# Patient Record
Sex: MALE | Race: WHITE | Employment: OTHER | URBAN - METROPOLITAN AREA
[De-identification: names, ages, dates, MRNs, and addresses within clinical notes are randomized per-mention and may not be internally consistent; named-entity substitution may affect disease eponyms.]

---

## 2017-01-09 ENCOUNTER — TELEPHONE (OUTPATIENT)
Dept: FAMILY MEDICINE CLINIC | Facility: CLINIC | Age: 57
End: 2017-01-09

## 2017-01-09 DIAGNOSIS — E55.9 VITAMIN D DEFICIENCY: ICD-10-CM

## 2017-01-09 DIAGNOSIS — Z00.00 LABORATORY TESTS ORDERED AS PART OF A COMPLETE PHYSICAL EXAM (CPE): ICD-10-CM

## 2017-01-09 DIAGNOSIS — Z12.5 SCREENING FOR PROSTATE CANCER: ICD-10-CM

## 2017-01-09 DIAGNOSIS — Z13.29 SCREENING FOR THYROID DISORDER: ICD-10-CM

## 2017-01-09 DIAGNOSIS — E78.00 PURE HYPERCHOLESTEROLEMIA: Primary | ICD-10-CM

## 2017-01-09 DIAGNOSIS — Z13.0 SCREENING FOR IRON DEFICIENCY ANEMIA: ICD-10-CM

## 2017-01-09 NOTE — TELEPHONE ENCOUNTER
Please authorize pended labs below. Pt has an appt on 03/02/2017 and would like to get his labs done prior to his appt.

## 2017-02-13 ENCOUNTER — TELEPHONE (OUTPATIENT)
Dept: FAMILY MEDICINE CLINIC | Facility: CLINIC | Age: 57
End: 2017-02-13

## 2017-02-13 DIAGNOSIS — J30.89 OTHER ALLERGIC RHINITIS: ICD-10-CM

## 2017-02-13 DIAGNOSIS — Z88.9 MULTIPLE ALLERGIES: Primary | ICD-10-CM

## 2017-02-13 NOTE — TELEPHONE ENCOUNTER
Message received today from [facility]. See below and advise if OK to refer. THIS IS AN INTERNAL REFERRAL.     Patient Name: Genesis Hanna   : 1960   Reason for the order/referral: f/u apt   PCP: Slade Harman   Refer to Provider ([facility]

## 2017-02-22 ENCOUNTER — LAB ENCOUNTER (OUTPATIENT)
Dept: LAB | Age: 57
End: 2017-02-22
Attending: FAMILY MEDICINE
Payer: COMMERCIAL

## 2017-02-22 DIAGNOSIS — Z13.29 SCREENING FOR THYROID DISORDER: ICD-10-CM

## 2017-02-22 DIAGNOSIS — E78.00 PURE HYPERCHOLESTEROLEMIA: ICD-10-CM

## 2017-02-22 DIAGNOSIS — Z12.5 SCREENING FOR PROSTATE CANCER: ICD-10-CM

## 2017-02-22 DIAGNOSIS — Z00.00 LABORATORY TESTS ORDERED AS PART OF A COMPLETE PHYSICAL EXAM (CPE): ICD-10-CM

## 2017-02-22 DIAGNOSIS — Z13.0 SCREENING FOR IRON DEFICIENCY ANEMIA: ICD-10-CM

## 2017-02-22 DIAGNOSIS — E55.9 VITAMIN D DEFICIENCY: ICD-10-CM

## 2017-02-22 LAB
25-HYDROXYVITAMIN D (TOTAL): 27.5 NG/ML (ref 30–100)
ALBUMIN SERPL-MCNC: 4 G/DL (ref 3.5–4.8)
ALP LIVER SERPL-CCNC: 91 U/L (ref 45–117)
ALT SERPL-CCNC: 40 U/L (ref 17–63)
AST SERPL-CCNC: 18 U/L (ref 15–41)
BASOPHILS # BLD AUTO: 0.07 X10(3) UL (ref 0–0.1)
BASOPHILS NFR BLD AUTO: 1.2 %
BILIRUB SERPL-MCNC: 0.5 MG/DL (ref 0.1–2)
BILIRUB UR QL STRIP.AUTO: NEGATIVE
BUN BLD-MCNC: 12 MG/DL (ref 8–20)
CALCIUM BLD-MCNC: 9.2 MG/DL (ref 8.3–10.3)
CHLORIDE: 106 MMOL/L (ref 101–111)
CHOLEST SMN-MCNC: 196 MG/DL (ref ?–200)
CLARITY UR REFRACT.AUTO: CLEAR
CO2: 30 MMOL/L (ref 22–32)
COLOR UR AUTO: YELLOW
CREAT BLD-MCNC: 0.98 MG/DL (ref 0.7–1.3)
EOSINOPHIL # BLD AUTO: 0.34 X10(3) UL (ref 0–0.3)
EOSINOPHIL NFR BLD AUTO: 5.7 %
ERYTHROCYTE [DISTWIDTH] IN BLOOD BY AUTOMATED COUNT: 12 % (ref 11.5–16)
GLUCOSE BLD-MCNC: 98 MG/DL (ref 70–99)
GLUCOSE UR STRIP.AUTO-MCNC: NEGATIVE MG/DL
HCT VFR BLD AUTO: 44.2 % (ref 37–53)
HDLC SERPL-MCNC: 47 MG/DL (ref 45–?)
HDLC SERPL: 4.17 {RATIO} (ref ?–4.97)
HGB BLD-MCNC: 15.6 G/DL (ref 13–17)
IMMATURE GRANULOCYTE COUNT: 0.02 X10(3) UL (ref 0–1)
IMMATURE GRANULOCYTE RATIO %: 0.3 %
KETONES UR STRIP.AUTO-MCNC: NEGATIVE MG/DL
LDLC SERPL CALC-MCNC: 124 MG/DL (ref ?–130)
LEUKOCYTE ESTERASE UR QL STRIP.AUTO: NEGATIVE
LYMPHOCYTES # BLD AUTO: 2.14 X10(3) UL (ref 0.9–4)
LYMPHOCYTES NFR BLD AUTO: 35.8 %
M PROTEIN MFR SERPL ELPH: 7.5 G/DL (ref 6.1–8.3)
MCH RBC QN AUTO: 31.3 PG (ref 27–33.2)
MCHC RBC AUTO-ENTMCNC: 35.3 G/DL (ref 31–37)
MCV RBC AUTO: 88.6 FL (ref 80–99)
MONOCYTES # BLD AUTO: 0.52 X10(3) UL (ref 0.1–0.6)
MONOCYTES NFR BLD AUTO: 8.7 %
NEUTROPHIL ABS PRELIM: 2.88 X10 (3) UL (ref 1.3–6.7)
NEUTROPHILS # BLD AUTO: 2.88 X10(3) UL (ref 1.3–6.7)
NEUTROPHILS NFR BLD AUTO: 48.3 %
NITRITE UR QL STRIP.AUTO: NEGATIVE
NONHDLC SERPL-MCNC: 149 MG/DL (ref ?–130)
PH UR STRIP.AUTO: 5 [PH] (ref 4.5–8)
PLATELET # BLD AUTO: 276 10(3)UL (ref 150–450)
POTASSIUM SERPL-SCNC: 4.6 MMOL/L (ref 3.6–5.1)
PROT UR STRIP.AUTO-MCNC: NEGATIVE MG/DL
PSA SERPL-MCNC: 0.82 NG/ML (ref 0.01–4)
RBC # BLD AUTO: 4.99 X10(6)UL (ref 4.3–5.7)
RBC UR QL AUTO: NEGATIVE
RED CELL DISTRIBUTION WIDTH-SD: 38.7 FL (ref 35.1–46.3)
SODIUM SERPL-SCNC: 140 MMOL/L (ref 136–144)
SP GR UR STRIP.AUTO: 1.02 (ref 1–1.03)
TRIGLYCERIDES: 126 MG/DL (ref ?–150)
TSI SER-ACNC: 1.07 MIU/ML (ref 0.35–5.5)
UROBILINOGEN UR STRIP.AUTO-MCNC: <2 MG/DL
VLDL: 25 MG/DL (ref 5–40)
WBC # BLD AUTO: 6 X10(3) UL (ref 4–13)

## 2017-02-22 PROCEDURE — 85025 COMPLETE CBC W/AUTO DIFF WBC: CPT

## 2017-02-22 PROCEDURE — 81003 URINALYSIS AUTO W/O SCOPE: CPT

## 2017-02-22 PROCEDURE — 84443 ASSAY THYROID STIM HORMONE: CPT

## 2017-02-22 PROCEDURE — 80053 COMPREHEN METABOLIC PANEL: CPT

## 2017-02-22 PROCEDURE — 84153 ASSAY OF PSA TOTAL: CPT

## 2017-02-22 PROCEDURE — 36415 COLL VENOUS BLD VENIPUNCTURE: CPT

## 2017-02-22 PROCEDURE — 80061 LIPID PANEL: CPT

## 2017-02-22 PROCEDURE — 82306 VITAMIN D 25 HYDROXY: CPT

## 2017-03-02 ENCOUNTER — OFFICE VISIT (OUTPATIENT)
Dept: FAMILY MEDICINE CLINIC | Facility: CLINIC | Age: 57
End: 2017-03-02

## 2017-03-02 VITALS
OXYGEN SATURATION: 98 % | RESPIRATION RATE: 16 BRPM | DIASTOLIC BLOOD PRESSURE: 72 MMHG | TEMPERATURE: 97 F | SYSTOLIC BLOOD PRESSURE: 120 MMHG | HEART RATE: 76 BPM | WEIGHT: 214 LBS | BODY MASS INDEX: 28.36 KG/M2 | HEIGHT: 73 IN

## 2017-03-02 DIAGNOSIS — E55.9 VITAMIN D DEFICIENCY: ICD-10-CM

## 2017-03-02 DIAGNOSIS — Z00.00 PHYSICAL EXAM, ANNUAL: Primary | ICD-10-CM

## 2017-03-02 PROCEDURE — 99396 PREV VISIT EST AGE 40-64: CPT | Performed by: FAMILY MEDICINE

## 2017-03-02 RX ORDER — ERGOCALCIFEROL 1.25 MG/1
50000 CAPSULE ORAL
Qty: 4 CAPSULE | Refills: 2 | Status: SHIPPED | OUTPATIENT
Start: 2017-03-02 | End: 2017-04-01

## 2017-03-02 NOTE — PROGRESS NOTES
Iris Rodriguez is a 61year old male who presents for a complete physical exam.   HPI:   Pt has no complains.       Immunization History  Administered            Date(s) Administered    >= 3 Yrs, Influenza Vaccine,(67322),[facility] Azelastine-Fluticasone (DYMISTA) 137-50 MCG/ACT Nasal Suspension by Nasal route. Disp:  Rfl:    Cetirizine-Pseudoephedrine (ALLERGY D-12 OR) Take  by mouth. Disp:  Rfl:    Omega-3 Fatty Acids (FISH OIL OR) by Does not apply route.  Disp:  Rfl:    Multiple asthma    EXAM:   /72 mmHg  Pulse 76  Temp(Src) 97.2 °F (36.2 °C) (Oral)  Resp 16  Ht 73\"  Wt 214 lb  BMI 28.24 kg/m2  SpO2 98%  Body mass index is 28.24 kg/(m^2).    GENERAL: well developed, well nourished,in no apparent distress  SKIN: no rashes,no ROUTINE   Result Value Ref Range   Urine Color Yellow Yellow   Clarity Urine Clear Clear   Spec Gravity 1.017 1.001-1.030   Glucose Urine Negative Negative mg/dl   Bilirubin Urine Negative Negative   Ketones Urine Negative Negative mg/dL   Blood Urine Nega blood test results reviewed with patient in the office. Pt is UTD with screening colonoscopy. The patient indicates understanding of these issues and agrees to the plan. The patient is asked to return for CPX in 1 year.

## 2017-04-03 ENCOUNTER — TELEPHONE (OUTPATIENT)
Dept: FAMILY MEDICINE CLINIC | Facility: CLINIC | Age: 57
End: 2017-04-03

## 2017-04-03 NOTE — TELEPHONE ENCOUNTER
Call from pt asking for appt with dr Baltazar Alejandra or other provider in of[place] tomorrow. sts has hx of occasional right knee pain, stepped down normally today and right knee gave out, now having pain and sl difficulty walking.  Taking ibuprofen and trying to s

## 2017-04-04 ENCOUNTER — HOSPITAL ENCOUNTER (OUTPATIENT)
Dept: GENERAL RADIOLOGY | Age: 57
Discharge: HOME | End: 2017-04-04
Attending: FAMILY MEDICINE
Payer: COMMERCIAL

## 2017-04-04 ENCOUNTER — OFFICE VISIT (OUTPATIENT)
Dept: FAMILY MEDICINE CLINIC | Facility: CLINIC | Age: 57
End: 2017-04-04

## 2017-04-04 VITALS
HEART RATE: 84 BPM | SYSTOLIC BLOOD PRESSURE: 108 MMHG | WEIGHT: 216 LBS | TEMPERATURE: 98 F | HEIGHT: 73 IN | DIASTOLIC BLOOD PRESSURE: 60 MMHG | OXYGEN SATURATION: 99 % | RESPIRATION RATE: 16 BRPM | BODY MASS INDEX: 28.63 KG/M2

## 2017-04-04 DIAGNOSIS — S83.92XA SPRAIN OF KNEE/LEG, LEFT, INITIAL ENCOUNTER: ICD-10-CM

## 2017-04-04 DIAGNOSIS — S83.92XA SPRAIN OF KNEE/LEG, LEFT, INITIAL ENCOUNTER: Primary | ICD-10-CM

## 2017-04-04 PROCEDURE — 73560 X-RAY EXAM OF KNEE 1 OR 2: CPT

## 2017-04-04 PROCEDURE — 99214 OFFICE O/P EST MOD 30 MIN: CPT | Performed by: FAMILY MEDICINE

## 2017-04-04 RX ORDER — NAPROXEN 500 MG/1
500 TABLET ORAL 2 TIMES DAILY WITH MEALS
Qty: 28 TABLET | Refills: 0 | Status: SHIPPED | OUTPATIENT
Start: 2017-04-04 | End: 2017-04-18

## 2017-04-04 RX ORDER — NAPROXEN 500 MG/1
500 TABLET ORAL 2 TIMES DAILY WITH MEALS
COMMUNITY
End: 2017-09-15 | Stop reason: ALTCHOICE

## 2017-04-04 RX ORDER — ERGOCALCIFEROL (VITAMIN D2) 1250 MCG
CAPSULE ORAL WEEKLY
COMMUNITY
End: 2017-05-12 | Stop reason: ALTCHOICE

## 2017-04-04 RX ORDER — IBUPROFEN 200 MG
200 TABLET ORAL EVERY 6 HOURS PRN
COMMUNITY
End: 2018-03-07 | Stop reason: ALTCHOICE

## 2017-04-04 NOTE — PATIENT INSTRUCTIONS
Knee Sprain    A sprain is an injury to the ligaments or capsule that holds a joint together. There are no broken bones. Most sprains take 3 to 6 weeks to heal. If it a severe sprain where the ligament is completely torn, it can take months to recover. · You may use over-the-counter pain medicine to control pain, unless another pain medicine was prescribed. If you have chronic liver or kidney disease or ever had a stomach ulcer or GI bleeding, talk with your healthcare provider before using these medicine RICE stands for rest, ice, compression, and elevation. These can limit pain and swelling after an injury.  RICE may be recommended to help treat fractures, sprains, strains, and bruises or bumps.   Home care  The following explain the details of RICE:  · Re © 1656-6434 [address], [address] [place]. All rights reserved. This information is not intended as a substitute for professional medical care. Always follow your healthcare professional's instructions.

## 2017-04-04 NOTE — PROGRESS NOTES
[facility] Family Medicine Office Note  Chief Complaint:   Patient presents with:  Knee Pain: left knee gave out yesterday, mild to severe pain, started since november progessivley got worse.       HPI:   This is a 61year old male coming in for l Urine Negative Negative mg/dL   Blood Urine Negative Negative   pH Urine 5.0 4.5-8.0   Protein Urine Negative Negative mg/dl   Urobilinogen Urine <2.0 0.2-2.0 mg/dL   Nitrite Urine Negative Negative   Leukocyte Esterase Urine Negative Negative   Microscopi Father    • Lipids Father    • other[other] [OTHER] Father      arthritis   • other[other] [OTHER] Mother      bowel rupture   • other[other] [OTHER] Mother      arthritis   • Heart Disorder Paternal Grandmother    • Heart Disorder Paternal Grandfather joint pain, denies joint swelling or stiffness. NEUROLOGICAL:  Denies headache, seizures, dizziness, syncope, paralysis, ataxia, numbness or tingling in the extremities,change in bowel or bladder control.   HEMATOLOGIC:  Denies anemia, bleeding or bruising (chronic per patient). NEURO:  No deficit, strength and tone, sensory intact. ASSESSMENT AND PLAN:   1.  Sprain of knee/leg, left, initial encounter  Discussed nature of illness, RICE, given ace wrap and use as directed, avoid provocative factors, consi

## 2017-05-12 ENCOUNTER — OFFICE VISIT (OUTPATIENT)
Dept: FAMILY MEDICINE CLINIC | Facility: CLINIC | Age: 57
End: 2017-05-12

## 2017-05-12 VITALS
TEMPERATURE: 98 F | HEART RATE: 80 BPM | SYSTOLIC BLOOD PRESSURE: 112 MMHG | BODY MASS INDEX: 28.89 KG/M2 | RESPIRATION RATE: 16 BRPM | HEIGHT: 73 IN | WEIGHT: 218 LBS | DIASTOLIC BLOOD PRESSURE: 82 MMHG

## 2017-05-12 DIAGNOSIS — M25.562 ACUTE PAIN OF LEFT KNEE: Primary | ICD-10-CM

## 2017-05-12 PROCEDURE — 99213 OFFICE O/P EST LOW 20 MIN: CPT | Performed by: FAMILY MEDICINE

## 2017-05-12 RX ORDER — DICLOFENAC SODIUM 75 MG/1
75 TABLET, DELAYED RELEASE ORAL 2 TIMES DAILY
Qty: 30 TABLET | Refills: 0 | Status: SHIPPED | OUTPATIENT
Start: 2017-05-12 | End: 2017-09-15 | Stop reason: ALTCHOICE

## 2017-05-12 NOTE — PATIENT INSTRUCTIONS
Diclofenac 75 mg 1 tablet twice a day with food. See Dr. Alexis Kline orthopedic for evaluation of the knee problem.

## 2017-05-12 NOTE — PROGRESS NOTES
Lluvia Martin is a 61year old male. cc left knee pain  HPI:   Patient is coming for evaluation of the left knee pain which started in April. Patient was exercising his knee gave out and he started to have a pain.   The pain is at the left medial aspec exertion  CARDIOVASCULAR: denies chest pain on exertion  GI: denies abdominal pain and denies heartburn  NEURO: denies headaches, numbness no tingling  Musculoskeletal left knee pain and swelling  Psych normal mood.     EXAM:   /82 mmHg  Pulse 80  Tem

## 2017-05-31 ENCOUNTER — HOSPITAL ENCOUNTER (OUTPATIENT)
Dept: MRI IMAGING | Facility: HOSPITAL | Age: 57
Discharge: HOME | End: 2017-05-31
Attending: ORTHOPAEDIC SURGERY
Payer: COMMERCIAL

## 2017-05-31 DIAGNOSIS — G89.29 CHRONIC PAIN OF LEFT KNEE: ICD-10-CM

## 2017-05-31 DIAGNOSIS — M17.12 PRIMARY OSTEOARTHRITIS OF LEFT KNEE: ICD-10-CM

## 2017-05-31 DIAGNOSIS — M25.562 CHRONIC PAIN OF LEFT KNEE: ICD-10-CM

## 2017-05-31 DIAGNOSIS — M23.92 INTERNAL DERANGEMENT OF KNEE, LEFT: ICD-10-CM

## 2017-05-31 PROCEDURE — 73721 MRI JNT OF LWR EXTRE W/O DYE: CPT | Performed by: ORTHOPAEDIC SURGERY

## 2017-06-08 PROBLEM — M17.12 PRIMARY OSTEOARTHRITIS OF LEFT KNEE: Status: ACTIVE | Noted: 2017-06-08

## 2017-06-08 PROBLEM — S83.242A ACUTE MEDIAL MENISCUS TEAR OF LEFT KNEE, INITIAL ENCOUNTER: Status: ACTIVE | Noted: 2017-06-08

## 2017-06-22 ENCOUNTER — EKG ENCOUNTER (OUTPATIENT)
Dept: LAB | Facility: HOSPITAL | Age: 57
End: 2017-06-22
Attending: ORTHOPAEDIC SURGERY
Payer: COMMERCIAL

## 2017-06-22 DIAGNOSIS — Z01.810 PRE-OPERATIVE CARDIOVASCULAR EXAMINATION: Primary | ICD-10-CM

## 2017-06-22 DIAGNOSIS — Z01.812 PRE-OPERATIVE LABORATORY EXAMINATION: ICD-10-CM

## 2017-06-22 PROCEDURE — 36415 COLL VENOUS BLD VENIPUNCTURE: CPT

## 2017-06-22 PROCEDURE — 80053 COMPREHEN METABOLIC PANEL: CPT

## 2017-06-22 PROCEDURE — 93010 ELECTROCARDIOGRAM REPORT: CPT | Performed by: INTERNAL MEDICINE

## 2017-06-22 PROCEDURE — 93005 ELECTROCARDIOGRAM TRACING: CPT

## 2017-06-22 PROCEDURE — 85025 COMPLETE CBC W/AUTO DIFF WBC: CPT

## 2017-07-24 ENCOUNTER — OFFICE VISIT (OUTPATIENT)
Dept: FAMILY MEDICINE CLINIC | Facility: CLINIC | Age: 57
End: 2017-07-24

## 2017-07-24 VITALS
BODY MASS INDEX: 27.83 KG/M2 | RESPIRATION RATE: 16 BRPM | TEMPERATURE: 99 F | HEART RATE: 88 BPM | DIASTOLIC BLOOD PRESSURE: 80 MMHG | HEIGHT: 73 IN | WEIGHT: 210 LBS | SYSTOLIC BLOOD PRESSURE: 130 MMHG

## 2017-07-24 DIAGNOSIS — M79.671 RIGHT FOOT PAIN: Primary | ICD-10-CM

## 2017-07-24 PROCEDURE — 99213 OFFICE O/P EST LOW 20 MIN: CPT | Performed by: FAMILY MEDICINE

## 2017-07-24 NOTE — PROGRESS NOTES
[facility] Family Medicine Office Note  Chief Complaint:   Patient presents with:  Pain: right foot puncture wound      HPI:   This is a 61year old male coming in for right plantar foot pain for the past 3 weeks.   Patient states he has been doin Prescriptions:  Diclofenac Sodium 75 MG Oral Tab EC Take 1 tablet (75 mg total) by mouth 2 (two) times daily. Disp: 30 tablet Rfl: 0   ibuprofen (ADVIL) 200 MG Oral Tab Take 200 mg by mouth every 6 (six) hours as needed for Pain.  Disp:  Rfl:    naproxen 50 upon palpation of area of concern on middle of plantar fascia  NEURO:  CN 2 - 12 grossly intact     ASSESSMENT AND PLAN:   1.  Right foot pain  -  Check xray to r/o foreign body  -  No signs of infection on exam  -  Tetanus vaccine up to date  -  Will refer

## 2017-08-08 ENCOUNTER — HOSPITAL ENCOUNTER (OUTPATIENT)
Dept: GENERAL RADIOLOGY | Age: 57
Discharge: HOME | End: 2017-08-08
Attending: FAMILY MEDICINE
Payer: COMMERCIAL

## 2017-08-08 DIAGNOSIS — M79.671 RIGHT FOOT PAIN: ICD-10-CM

## 2017-08-08 PROBLEM — S83.282A ACUTE LATERAL MENISCUS TEAR OF LEFT KNEE: Status: ACTIVE | Noted: 2017-08-08

## 2017-08-08 PROBLEM — S83.242A ACUTE MEDIAL MENISCUS TEAR OF LEFT KNEE: Status: ACTIVE | Noted: 2017-08-08

## 2017-08-08 PROCEDURE — 73630 X-RAY EXAM OF FOOT: CPT | Performed by: FAMILY MEDICINE

## 2017-08-10 ENCOUNTER — HOSPITAL ENCOUNTER (OUTPATIENT)
Dept: PHYSICAL THERAPY | Facility: HOSPITAL | Age: 57
Setting detail: THERAPIES SERIES
Discharge: HOME | End: 2017-08-10
Attending: ORTHOPAEDIC SURGERY
Payer: COMMERCIAL

## 2017-08-10 DIAGNOSIS — M17.12 PRIMARY OSTEOARTHRITIS OF LEFT KNEE: ICD-10-CM

## 2017-08-10 DIAGNOSIS — S83.242D ACUTE MEDIAL MENISCUS TEAR OF LEFT KNEE, SUBSEQUENT ENCOUNTER: ICD-10-CM

## 2017-08-10 PROCEDURE — 97110 THERAPEUTIC EXERCISES: CPT

## 2017-08-10 PROCEDURE — 97162 PT EVAL MOD COMPLEX 30 MIN: CPT

## 2017-08-10 NOTE — PROGRESS NOTES
POST-OP KNEE EVALUATION:   Referring Physician: Dr. Isabel Oviedo  Diagnosis: s/p arthroscopic sx L knee, meniscal tear, OA  Date of Service: 8/10/2017     PATIENT SUMMARY   Tyesha Patel is a 61year old y/o male who presents to therapy today s/p arthro involved Moderate Complexity decision making due to 3+ personal factors/comorbidities, 3 body structures involved/activity limitations, and evolving symptoms including changing pain levels.         Total Timed Treatment: 20 min     Total Treatment Time: 45 8/10/2017  To:11/8/2017

## 2017-08-15 ENCOUNTER — HOSPITAL ENCOUNTER (OUTPATIENT)
Dept: PHYSICAL THERAPY | Facility: HOSPITAL | Age: 57
Setting detail: THERAPIES SERIES
Discharge: HOME | End: 2017-08-15
Attending: ORTHOPAEDIC SURGERY
Payer: COMMERCIAL

## 2017-08-15 PROCEDURE — 97110 THERAPEUTIC EXERCISES: CPT

## 2017-08-15 PROCEDURE — 97016 VASOPNEUMATIC DEVICE THERAPY: CPT

## 2017-08-15 PROCEDURE — 97140 MANUAL THERAPY 1/> REGIONS: CPT

## 2017-08-15 NOTE — PROGRESS NOTES
Dx: s/p arthroscopy,meniscal sx        Authorized # of Visits:  8         Next MD visit: none scheduled  Fall Risk: standard         Precautions: n/a             Subjective:rode bike a couple times and felt good.  powerrwashed deck yesterday and now sore  O

## 2017-08-18 ENCOUNTER — HOSPITAL ENCOUNTER (OUTPATIENT)
Dept: PHYSICAL THERAPY | Facility: HOSPITAL | Age: 57
Setting detail: THERAPIES SERIES
Discharge: HOME | End: 2017-08-18
Attending: ORTHOPAEDIC SURGERY
Payer: COMMERCIAL

## 2017-08-18 PROCEDURE — 97140 MANUAL THERAPY 1/> REGIONS: CPT

## 2017-08-18 PROCEDURE — 97016 VASOPNEUMATIC DEVICE THERAPY: CPT

## 2017-08-18 PROCEDURE — 97110 THERAPEUTIC EXERCISES: CPT

## 2017-08-18 NOTE — PROGRESS NOTES
Dx: s/p arthroscopy,meniscal sx        Authorized # of Visits:  8         Next MD visit: none scheduled  Fall Risk: standard         Precautions: n/a             Subjective:reports knee feels about 80%.  Reports stair ex difficult due to burning pain R foot

## 2017-08-22 ENCOUNTER — HOSPITAL ENCOUNTER (OUTPATIENT)
Dept: PHYSICAL THERAPY | Facility: HOSPITAL | Age: 57
Setting detail: THERAPIES SERIES
Discharge: HOME | End: 2017-08-22
Attending: ORTHOPAEDIC SURGERY
Payer: COMMERCIAL

## 2017-08-22 PROCEDURE — 97140 MANUAL THERAPY 1/> REGIONS: CPT

## 2017-08-22 PROCEDURE — 97110 THERAPEUTIC EXERCISES: CPT

## 2017-08-22 NOTE — PROGRESS NOTES
Dx: s/p arthroscopy,meniscal sx        Authorized # of Visits:  8         Next MD visit: none scheduled  Fall Risk: standard         Precautions: n/a             Subjective:knee bend better but continues unable to kneel./ Foot pain unchanged  Objective: RO

## 2017-08-24 ENCOUNTER — HOSPITAL ENCOUNTER (OUTPATIENT)
Dept: PHYSICAL THERAPY | Facility: HOSPITAL | Age: 57
Setting detail: THERAPIES SERIES
Discharge: HOME | End: 2017-08-24
Attending: ORTHOPAEDIC SURGERY
Payer: COMMERCIAL

## 2017-08-24 PROCEDURE — 97110 THERAPEUTIC EXERCISES: CPT

## 2017-08-24 NOTE — PROGRESS NOTES
Dx: s/p arthroscopy,meniscal sx        Authorized # of Visits:  8         Next MD visit: none scheduled  Fall Risk: standard         Precautions: n/a            Progress Summary    Pt has attended 5 visits in Physical Therapy.      Subjective: knee painful TX#: 4/ Date:   8/24/2017              TX#: 5/ Date:               TX#: 6/ Date:               TX#: 7/ Date:               TX#: 8/   nustep x 5 min l-5 Stationary bike x 5 min cont'd cont'd      MT HS  And Patellar mob cont'd cont'd w/ addition of grimaldo

## 2017-09-15 ENCOUNTER — OFFICE VISIT (OUTPATIENT)
Dept: FAMILY MEDICINE CLINIC | Facility: CLINIC | Age: 57
End: 2017-09-15

## 2017-09-15 VITALS
RESPIRATION RATE: 18 BRPM | OXYGEN SATURATION: 98 % | HEART RATE: 85 BPM | DIASTOLIC BLOOD PRESSURE: 72 MMHG | WEIGHT: 211 LBS | SYSTOLIC BLOOD PRESSURE: 124 MMHG | BODY MASS INDEX: 27.96 KG/M2 | HEIGHT: 73 IN | TEMPERATURE: 97 F

## 2017-09-15 DIAGNOSIS — J01.00 ACUTE MAXILLARY SINUSITIS, RECURRENCE NOT SPECIFIED: Primary | ICD-10-CM

## 2017-09-15 PROCEDURE — 99213 OFFICE O/P EST LOW 20 MIN: CPT | Performed by: FAMILY MEDICINE

## 2017-09-15 RX ORDER — AZITHROMYCIN 250 MG/1
TABLET, FILM COATED ORAL
Qty: 6 TABLET | Refills: 0 | Status: SHIPPED | OUTPATIENT
Start: 2017-09-15 | End: 2018-03-07 | Stop reason: ALTCHOICE

## 2017-09-15 NOTE — PROGRESS NOTES
Davin Dominguez is a 61year old male. cc sinus congestion cough. HPI:   Coming to the office complaining of having sinus congestion cough for 1 week. He is a sinus pressure stuffiness worse at the end of the day.   Some postnasal drip hoarseness some e 97.1 °F (36.2 °C) (Oral)   Resp 18   Ht 73\"   Wt 211 lb   SpO2 98%   BMI 27.84 kg/m²   GENERAL: well developed, well nourished,in no apparent distress  SKIN: no rashes,no suspicious lesions  HEENT: atraumatic, normocephalic,ears - minimal fluid behind tym

## 2017-11-03 ENCOUNTER — IMMUNIZATION (OUTPATIENT)
Dept: FAMILY MEDICINE CLINIC | Facility: CLINIC | Age: 57
End: 2017-11-03

## 2017-11-03 DIAGNOSIS — Z23 NEED FOR VACCINATION: ICD-10-CM

## 2017-11-03 PROCEDURE — 90686 IIV4 VACC NO PRSV 0.5 ML IM: CPT | Performed by: FAMILY MEDICINE

## 2017-11-03 PROCEDURE — 90471 IMMUNIZATION ADMIN: CPT | Performed by: FAMILY MEDICINE

## 2018-03-07 ENCOUNTER — OFFICE VISIT (OUTPATIENT)
Dept: FAMILY MEDICINE CLINIC | Facility: CLINIC | Age: 58
End: 2018-03-07

## 2018-03-07 ENCOUNTER — LAB ENCOUNTER (OUTPATIENT)
Dept: LAB | Age: 58
End: 2018-03-07
Attending: FAMILY MEDICINE
Payer: COMMERCIAL

## 2018-03-07 VITALS
HEART RATE: 81 BPM | WEIGHT: 215 LBS | DIASTOLIC BLOOD PRESSURE: 78 MMHG | RESPIRATION RATE: 16 BRPM | SYSTOLIC BLOOD PRESSURE: 108 MMHG | BODY MASS INDEX: 28.49 KG/M2 | TEMPERATURE: 98 F | HEIGHT: 73 IN

## 2018-03-07 DIAGNOSIS — J30.89 NON-SEASONAL ALLERGIC RHINITIS DUE TO OTHER ALLERGIC TRIGGER: ICD-10-CM

## 2018-03-07 DIAGNOSIS — Z13.89 SCREENING FOR GENITOURINARY CONDITION: ICD-10-CM

## 2018-03-07 DIAGNOSIS — Z00.00 LABORATORY EXAMINATION ORDERED AS PART OF A ROUTINE GENERAL MEDICAL EXAMINATION: ICD-10-CM

## 2018-03-07 DIAGNOSIS — Z00.00 PHYSICAL EXAM, ANNUAL: Primary | ICD-10-CM

## 2018-03-07 DIAGNOSIS — E78.00 PURE HYPERCHOLESTEROLEMIA: Primary | ICD-10-CM

## 2018-03-07 DIAGNOSIS — Z12.5 SCREENING FOR PROSTATE CANCER: ICD-10-CM

## 2018-03-07 DIAGNOSIS — Z86.018 HISTORY OF NEUROMA: ICD-10-CM

## 2018-03-07 DIAGNOSIS — M79.671 RIGHT FOOT PAIN: ICD-10-CM

## 2018-03-07 DIAGNOSIS — E55.9 VITAMIN D DEFICIENCY: ICD-10-CM

## 2018-03-07 LAB
25-HYDROXYVITAMIN D (TOTAL): 32.6 NG/ML (ref 30–100)
ALBUMIN SERPL-MCNC: 3.9 G/DL (ref 3.5–4.8)
ALP LIVER SERPL-CCNC: 68 U/L (ref 45–117)
ALT SERPL-CCNC: 27 U/L (ref 17–63)
AST SERPL-CCNC: 19 U/L (ref 15–41)
BASOPHILS # BLD AUTO: 0.06 X10(3) UL (ref 0–0.1)
BASOPHILS NFR BLD AUTO: 1 %
BILIRUB SERPL-MCNC: 0.9 MG/DL (ref 0.1–2)
BILIRUB UR QL STRIP.AUTO: NEGATIVE
BUN BLD-MCNC: 17 MG/DL (ref 8–20)
CALCIUM BLD-MCNC: 9.3 MG/DL (ref 8.3–10.3)
CHLORIDE: 105 MMOL/L (ref 101–111)
CHOLEST SMN-MCNC: 204 MG/DL (ref ?–200)
CLARITY UR REFRACT.AUTO: CLEAR
CO2: 29 MMOL/L (ref 22–32)
COLOR UR AUTO: YELLOW
CREAT BLD-MCNC: 1 MG/DL (ref 0.7–1.3)
EOSINOPHIL # BLD AUTO: 0.38 X10(3) UL (ref 0–0.3)
EOSINOPHIL NFR BLD AUTO: 6.6 %
ERYTHROCYTE [DISTWIDTH] IN BLOOD BY AUTOMATED COUNT: 12.3 % (ref 11.5–16)
GLUCOSE BLD-MCNC: 98 MG/DL (ref 70–99)
GLUCOSE UR STRIP.AUTO-MCNC: NEGATIVE MG/DL
HCT VFR BLD AUTO: 43.9 % (ref 37–53)
HDLC SERPL-MCNC: 37 MG/DL (ref 45–?)
HDLC SERPL: 5.51 {RATIO} (ref ?–4.97)
HGB BLD-MCNC: 14.8 G/DL (ref 13–17)
IMMATURE GRANULOCYTE COUNT: 0.01 X10(3) UL (ref 0–1)
IMMATURE GRANULOCYTE RATIO %: 0.2 %
KETONES UR STRIP.AUTO-MCNC: NEGATIVE MG/DL
LDLC SERPL CALC-MCNC: 107 MG/DL (ref ?–130)
LEUKOCYTE ESTERASE UR QL STRIP.AUTO: NEGATIVE
LYMPHOCYTES # BLD AUTO: 1.55 X10(3) UL (ref 0.9–4)
LYMPHOCYTES NFR BLD AUTO: 27.1 %
M PROTEIN MFR SERPL ELPH: 7.2 G/DL (ref 6.1–8.3)
MCH RBC QN AUTO: 30.3 PG (ref 27–33.2)
MCHC RBC AUTO-ENTMCNC: 33.7 G/DL (ref 31–37)
MCV RBC AUTO: 90 FL (ref 80–99)
MONOCYTES # BLD AUTO: 0.52 X10(3) UL (ref 0.1–1)
MONOCYTES NFR BLD AUTO: 9.1 %
NEUTROPHIL ABS PRELIM: 3.21 X10 (3) UL (ref 1.3–6.7)
NEUTROPHILS # BLD AUTO: 3.21 X10(3) UL (ref 1.3–6.7)
NEUTROPHILS NFR BLD AUTO: 56 %
NITRITE UR QL STRIP.AUTO: NEGATIVE
NONHDLC SERPL-MCNC: 167 MG/DL (ref ?–130)
PH UR STRIP.AUTO: 5 [PH] (ref 4.5–8)
PLATELET # BLD AUTO: 234 10(3)UL (ref 150–450)
POTASSIUM SERPL-SCNC: 4.2 MMOL/L (ref 3.6–5.1)
PROT UR STRIP.AUTO-MCNC: NEGATIVE MG/DL
PSA SERPL-MCNC: 0.79 NG/ML (ref 0.01–4)
RBC # BLD AUTO: 4.88 X10(6)UL (ref 4.3–5.7)
RBC UR QL AUTO: NEGATIVE
RED CELL DISTRIBUTION WIDTH-SD: 40.5 FL (ref 35.1–46.3)
SODIUM SERPL-SCNC: 139 MMOL/L (ref 136–144)
SP GR UR STRIP.AUTO: 1.02 (ref 1–1.03)
TRIGL SERPL-MCNC: 300 MG/DL (ref ?–150)
TSI SER-ACNC: 1.5 MIU/ML (ref 0.35–5.5)
UROBILINOGEN UR STRIP.AUTO-MCNC: <2 MG/DL
VLDLC SERPL CALC-MCNC: 60 MG/DL (ref 5–40)
WBC # BLD AUTO: 5.7 X10(3) UL (ref 4–13)

## 2018-03-07 PROCEDURE — 82306 VITAMIN D 25 HYDROXY: CPT

## 2018-03-07 PROCEDURE — 80050 GENERAL HEALTH PANEL: CPT

## 2018-03-07 PROCEDURE — 36415 COLL VENOUS BLD VENIPUNCTURE: CPT

## 2018-03-07 PROCEDURE — 99396 PREV VISIT EST AGE 40-64: CPT | Performed by: FAMILY MEDICINE

## 2018-03-07 PROCEDURE — 84443 ASSAY THYROID STIM HORMONE: CPT

## 2018-03-07 PROCEDURE — 81003 URINALYSIS AUTO W/O SCOPE: CPT

## 2018-03-07 PROCEDURE — 80061 LIPID PANEL: CPT

## 2018-03-07 PROCEDURE — 84153 ASSAY OF PSA TOTAL: CPT

## 2018-03-07 PROCEDURE — 80053 COMPREHEN METABOLIC PANEL: CPT

## 2018-03-07 RX ORDER — BIOTIN 1 MG
1 TABLET ORAL DAILY
COMMUNITY

## 2018-03-07 NOTE — PROGRESS NOTES
Lluvia Martin is a 61year old male who presents for a complete physical exam.   HPI:   Pt complains of pain in the right foot. History of neuroma on the left side required injection.   Now she he feels that there is a burning pain in the bottom of th 11/20/2014 23   ----------  ALT (U/L)   Date Value   07/30/2015 38   02/18/2015 31   11/20/2014 37   ----------  Alt (U/L)   Date Value   06/22/2017 30   02/22/2017 40   08/22/2016 47   ----------     PSA (ng/mL)   Date Value   02/22/2017 0.818   02/18/2 times per week.   Diet: watches calories closely     REVIEW OF SYSTEMS:   GENERAL: feels well otherwise  SKIN: denies any unusual skin lesions  EYES:denies blurred vision or double vision  HEENT: denies nasal congestion, sinus pain or ST  LUNGS: denies shor neuroma  Non-seasonal allergic rhinitis due to other allergic trigger      Orders Placed This Encounter      CBC W/DIFF      COMP METABOLIC PANEL      LIPID PANEL      URINALYSIS, ROUTINE      PSA      TSH W REFLEX TO FREE T4      VITAMIN D, 25-HYDROXY

## 2018-07-03 ENCOUNTER — APPOINTMENT (OUTPATIENT)
Dept: LAB | Age: 58
End: 2018-07-03
Attending: FAMILY MEDICINE
Payer: COMMERCIAL

## 2018-07-03 DIAGNOSIS — E78.00 PURE HYPERCHOLESTEROLEMIA: ICD-10-CM

## 2018-07-03 LAB
ALBUMIN SERPL-MCNC: 3.8 G/DL (ref 3.5–4.8)
ALP LIVER SERPL-CCNC: 66 U/L (ref 45–117)
ALT SERPL-CCNC: 27 U/L (ref 17–63)
AST SERPL-CCNC: 17 U/L (ref 15–41)
BILIRUB SERPL-MCNC: 1 MG/DL (ref 0.1–2)
BUN BLD-MCNC: 11 MG/DL (ref 8–20)
CALCIUM BLD-MCNC: 8.9 MG/DL (ref 8.3–10.3)
CHLORIDE: 106 MMOL/L (ref 101–111)
CHOLEST SMN-MCNC: 186 MG/DL (ref ?–200)
CO2: 26 MMOL/L (ref 22–32)
CREAT BLD-MCNC: 0.99 MG/DL (ref 0.7–1.3)
GLUCOSE BLD-MCNC: 90 MG/DL (ref 70–99)
HDLC SERPL-MCNC: 40 MG/DL (ref 45–?)
HDLC SERPL: 4.65 {RATIO} (ref ?–4.97)
LDLC SERPL CALC-MCNC: 106 MG/DL (ref ?–130)
M PROTEIN MFR SERPL ELPH: 6.9 G/DL (ref 6.1–8.3)
NONHDLC SERPL-MCNC: 146 MG/DL (ref ?–130)
POTASSIUM SERPL-SCNC: 4.1 MMOL/L (ref 3.6–5.1)
SODIUM SERPL-SCNC: 141 MMOL/L (ref 136–144)
TRIGL SERPL-MCNC: 198 MG/DL (ref ?–150)
VLDLC SERPL CALC-MCNC: 40 MG/DL (ref 5–40)

## 2018-07-03 PROCEDURE — 80053 COMPREHEN METABOLIC PANEL: CPT

## 2018-07-03 PROCEDURE — 80061 LIPID PANEL: CPT

## 2018-07-03 PROCEDURE — 36415 COLL VENOUS BLD VENIPUNCTURE: CPT

## 2018-07-11 ENCOUNTER — OFFICE VISIT (OUTPATIENT)
Dept: FAMILY MEDICINE CLINIC | Facility: CLINIC | Age: 58
End: 2018-07-11

## 2018-07-11 VITALS
RESPIRATION RATE: 16 BRPM | HEIGHT: 73 IN | SYSTOLIC BLOOD PRESSURE: 120 MMHG | TEMPERATURE: 98 F | WEIGHT: 208 LBS | HEART RATE: 90 BPM | DIASTOLIC BLOOD PRESSURE: 60 MMHG | BODY MASS INDEX: 27.57 KG/M2

## 2018-07-11 DIAGNOSIS — R53.83 FATIGUE, UNSPECIFIED TYPE: ICD-10-CM

## 2018-07-11 DIAGNOSIS — E55.9 VITAMIN D DEFICIENCY: ICD-10-CM

## 2018-07-11 DIAGNOSIS — E78.00 PURE HYPERCHOLESTEROLEMIA: Primary | ICD-10-CM

## 2018-07-11 DIAGNOSIS — G57.61 MORTON'S NEUROMA OF RIGHT FOOT: ICD-10-CM

## 2018-07-11 DIAGNOSIS — G89.29 CHRONIC FOOT PAIN, RIGHT: ICD-10-CM

## 2018-07-11 DIAGNOSIS — M79.671 CHRONIC FOOT PAIN, RIGHT: ICD-10-CM

## 2018-07-11 PROCEDURE — 99214 OFFICE O/P EST MOD 30 MIN: CPT | Performed by: FAMILY MEDICINE

## 2018-07-11 NOTE — PROGRESS NOTES
Shelia Rodriges is a 60year old male. cc hyperlipidemia, tiredness fatigue, right foot pain, vitamin D deficiency  HPI:   Patients come to the office complaining for follow-up of hyperlipidemia. Trying to watch his diet.   Activity level is limited he i pain  RESPIRATORY: denies shortness of breath with exertion  CARDIOVASCULAR: denies chest pain on exertion  GI: denies abdominal pain and denies heartburn  NEURO: denies headaches  Psych normal mood.       EXAM:   /60   Pulse 90   Temp 97.6 °F (36.4 ° PANEL      COMP METABOLIC PANEL      VITAMIN D, 25-HYDROXY      TESTOSTERONE,FREE AND TOTAL [58787][Q]    Meds & Refills for this Visit:  No prescriptions requested or ordered in this encounter  Low-fat diet. Stay active.   Do fasting blood work in the [employer]

## 2018-07-11 NOTE — PATIENT INSTRUCTIONS
Low-fat diet. Stay active. Do fasting blood work in the fall or December follow-up in office after blood work. Monitor your sleep patterns. See Dr. Bryson Rojas for evaluation of the right foot pain.

## 2018-11-15 ENCOUNTER — IMMUNIZATION (OUTPATIENT)
Dept: FAMILY MEDICINE CLINIC | Facility: CLINIC | Age: 58
End: 2018-11-15

## 2018-11-15 DIAGNOSIS — Z23 NEED FOR VACCINATION: ICD-10-CM

## 2018-11-15 PROCEDURE — 90686 IIV4 VACC NO PRSV 0.5 ML IM: CPT | Performed by: PHYSICIAN ASSISTANT

## 2018-11-15 PROCEDURE — 90471 IMMUNIZATION ADMIN: CPT | Performed by: PHYSICIAN ASSISTANT

## 2018-12-13 ENCOUNTER — LAB ENCOUNTER (OUTPATIENT)
Dept: LAB | Age: 58
End: 2018-12-13
Attending: FAMILY MEDICINE
Payer: COMMERCIAL

## 2018-12-13 DIAGNOSIS — E55.9 VITAMIN D DEFICIENCY: ICD-10-CM

## 2018-12-13 DIAGNOSIS — R53.83 FATIGUE, UNSPECIFIED TYPE: ICD-10-CM

## 2018-12-13 DIAGNOSIS — E78.00 PURE HYPERCHOLESTEROLEMIA: ICD-10-CM

## 2018-12-13 PROCEDURE — 80053 COMPREHEN METABOLIC PANEL: CPT

## 2018-12-13 PROCEDURE — 84402 ASSAY OF FREE TESTOSTERONE: CPT

## 2018-12-13 PROCEDURE — 80061 LIPID PANEL: CPT

## 2018-12-13 PROCEDURE — 36415 COLL VENOUS BLD VENIPUNCTURE: CPT

## 2018-12-13 PROCEDURE — 84403 ASSAY OF TOTAL TESTOSTERONE: CPT

## 2018-12-13 PROCEDURE — 82306 VITAMIN D 25 HYDROXY: CPT

## 2018-12-21 ENCOUNTER — OFFICE VISIT (OUTPATIENT)
Dept: FAMILY MEDICINE CLINIC | Facility: CLINIC | Age: 58
End: 2018-12-21

## 2018-12-21 VITALS
WEIGHT: 214 LBS | DIASTOLIC BLOOD PRESSURE: 76 MMHG | RESPIRATION RATE: 16 BRPM | SYSTOLIC BLOOD PRESSURE: 124 MMHG | BODY MASS INDEX: 28.36 KG/M2 | HEART RATE: 97 BPM | TEMPERATURE: 97 F | HEIGHT: 73 IN

## 2018-12-21 DIAGNOSIS — E78.00 PURE HYPERCHOLESTEROLEMIA: Primary | ICD-10-CM

## 2018-12-21 DIAGNOSIS — E78.1 PURE HYPERGLYCERIDEMIA: ICD-10-CM

## 2018-12-21 DIAGNOSIS — E55.9 VITAMIN D DEFICIENCY: ICD-10-CM

## 2018-12-21 DIAGNOSIS — Z00.00 LABORATORY TESTS ORDERED AS PART OF A COMPLETE PHYSICAL EXAM (CPE): ICD-10-CM

## 2018-12-21 DIAGNOSIS — N52.9 ERECTILE DYSFUNCTION, UNSPECIFIED ERECTILE DYSFUNCTION TYPE: ICD-10-CM

## 2018-12-21 PROCEDURE — 99214 OFFICE O/P EST MOD 30 MIN: CPT | Performed by: FAMILY MEDICINE

## 2018-12-21 RX ORDER — SILDENAFIL 50 MG/1
50 TABLET, FILM COATED ORAL
Qty: 30 TABLET | Refills: 1 | Status: SHIPPED | OUTPATIENT
Start: 2018-12-21 | End: 2020-07-06

## 2018-12-21 NOTE — PROGRESS NOTES
Ulises Giles is a 60year old male. cc hyperlipidemia, hyperglycemia, vitamin D deficiency, erectile dysfunction   HPI:   Is coming for follow-up of hyperlipidemia.   He was visiting his daughter in [place] his cholesterol came back high her triglyc REVIEW OF SYSTEMS:   GENERAL HEALTH: feels well otherwise  SKIN: denies any unusual skin lesions or rashes  HEENT no congestion no runny nose no sore throat  Neck no neck pain  RESPIRATORY: denies shortness of breath with exertion  CARDIOVASCULAR: latosha LIPID PANEL    Collection Time: 12/13/18  9:50 AM   Result Value Ref Range    Cholesterol, Total 198 <200 mg/dL    HDL Cholesterol 39 (L) 40 - 59 mg/dL    Triglycerides 216 (H) 30 - 149 mg/dL    LDL Cholesterol 116 (H) <100 mg/dL    VLDL 43 (H) 0 - 30 mg

## 2019-02-24 ENCOUNTER — OFFICE VISIT (OUTPATIENT)
Dept: FAMILY MEDICINE CLINIC | Facility: CLINIC | Age: 59
End: 2019-02-24

## 2019-02-24 VITALS
HEIGHT: 71 IN | TEMPERATURE: 99 F | OXYGEN SATURATION: 98 % | RESPIRATION RATE: 18 BRPM | BODY MASS INDEX: 28.7 KG/M2 | HEART RATE: 86 BPM | WEIGHT: 205 LBS | SYSTOLIC BLOOD PRESSURE: 122 MMHG | DIASTOLIC BLOOD PRESSURE: 72 MMHG

## 2019-02-24 DIAGNOSIS — J01.00 ACUTE NON-RECURRENT MAXILLARY SINUSITIS: Primary | ICD-10-CM

## 2019-02-24 PROCEDURE — 99213 OFFICE O/P EST LOW 20 MIN: CPT | Performed by: FAMILY MEDICINE

## 2019-02-24 RX ORDER — LEVOFLOXACIN 500 MG/1
500 TABLET, FILM COATED ORAL DAILY
Qty: 10 TABLET | Refills: 0 | Status: SHIPPED | OUTPATIENT
Start: 2019-02-24 | End: 2019-03-06

## 2019-02-24 NOTE — PROGRESS NOTES
CHIEF COMPLAINT:   Patient presents with:  Sinus Problem: facial pressure, nasal discharge, ringing in the ears - x5-6 days      HPI:   Ana Palencia is a 60year old male who presents for sinus congestion for  5  days.  Symptoms have been worsening si Father         MI at 42/stent coronary   • Hypertension Father    • Lipids Father    • Other (Other) Father         arthritis   • Other (Other) Mother         bowel rupture/arthritis   • Heart Disorder Paternal Grandmother    • Heart Disorder Paternal Jermain Ramon encounter. Meds & Refills for this Visit:  Requested Prescriptions     Signed Prescriptions Disp Refills   • levofloxacin (LEVAQUIN) 500 MG Oral Tab 10 tablet 0     Sig: Take 1 tablet (500 mg total) by mouth daily for 10 days.            Risks, benefit

## 2019-02-24 NOTE — PATIENT INSTRUCTIONS
Take antibiotics with food and plenty of water. Eat yogurt or take probiotic daily. (James Dashairam is a good example of an OTC probiotic)  Make sure to finish the entire antibiotic treatment. Increase fluids and rest.   Use otc meds as needed.   Monitor symptoms

## 2019-06-06 ENCOUNTER — LAB ENCOUNTER (OUTPATIENT)
Dept: LAB | Age: 59
End: 2019-06-06
Attending: FAMILY MEDICINE
Payer: COMMERCIAL

## 2019-06-06 DIAGNOSIS — E78.00 PURE HYPERCHOLESTEROLEMIA: ICD-10-CM

## 2019-06-06 DIAGNOSIS — Z00.00 LABORATORY TESTS ORDERED AS PART OF A COMPLETE PHYSICAL EXAM (CPE): ICD-10-CM

## 2019-06-06 DIAGNOSIS — E55.9 VITAMIN D DEFICIENCY: ICD-10-CM

## 2019-06-06 PROCEDURE — 82306 VITAMIN D 25 HYDROXY: CPT

## 2019-06-06 PROCEDURE — 36415 COLL VENOUS BLD VENIPUNCTURE: CPT

## 2019-06-06 PROCEDURE — 80053 COMPREHEN METABOLIC PANEL: CPT

## 2019-06-06 PROCEDURE — 84443 ASSAY THYROID STIM HORMONE: CPT

## 2019-06-06 PROCEDURE — 81003 URINALYSIS AUTO W/O SCOPE: CPT

## 2019-06-06 PROCEDURE — 80061 LIPID PANEL: CPT

## 2019-06-06 PROCEDURE — 85025 COMPLETE CBC W/AUTO DIFF WBC: CPT

## 2019-06-13 ENCOUNTER — OFFICE VISIT (OUTPATIENT)
Dept: FAMILY MEDICINE CLINIC | Facility: CLINIC | Age: 59
End: 2019-06-13

## 2019-06-13 VITALS
WEIGHT: 215 LBS | OXYGEN SATURATION: 98 % | HEART RATE: 68 BPM | TEMPERATURE: 98 F | RESPIRATION RATE: 18 BRPM | SYSTOLIC BLOOD PRESSURE: 116 MMHG | BODY MASS INDEX: 30.1 KG/M2 | DIASTOLIC BLOOD PRESSURE: 52 MMHG | HEIGHT: 71 IN

## 2019-06-13 DIAGNOSIS — J30.89 NON-SEASONAL ALLERGIC RHINITIS DUE TO OTHER ALLERGIC TRIGGER: ICD-10-CM

## 2019-06-13 DIAGNOSIS — D22.9 MULTIPLE NEVI: ICD-10-CM

## 2019-06-13 DIAGNOSIS — Z00.00 PHYSICAL EXAM, ANNUAL: Primary | ICD-10-CM

## 2019-06-13 PROCEDURE — 99396 PREV VISIT EST AGE 40-64: CPT | Performed by: FAMILY MEDICINE

## 2019-06-13 NOTE — PATIENT INSTRUCTIONS
Progress Notes by Sea Doan PT at 03/03/17 09:00 AM     Author:  Sea Doan PT Service:  (none) Author Type:  Physical Therapist     Filed:  03/03/17 10:38 AM Encounter Date:  3/3/2017 Status:  Signed     :  Sea Doan PT (Physical Therapist)              PHYSICAL THERAPY DAILY NOTE     DIAGNOSIS: Right shoulder pain   1. Chronic right shoulder pain    2. Muscle weakness of right upper extremity    3. Decreased range of motion (ROM) of shoulder          INSURANCE BENEFITS: HMO    ATTENDANCE: Patient has been seen for[NT1.1C] 5[NT1.1M] visits between 2/16/2017 and[NT1.1C]  3/3/2017[NT1.1T].  Progress Summary due by 3/16/17.    SUBJECTIVE:  The patient states[NT1.1C] she is all right.  The arm and shoulder are not too bad - the right hand is bothering her.[NT1.1M]      OBJECTIVE:   Short Term Goals (to be achieved in 2 weeks  1. Patient will be independent with the home exercise program (Met 2/22/17)  2. Patient will be able to reach to the top side and back of the head without cervical spine compensation for improved ability to perform bathing and dressing activity (working on soft tissue restrictions with manual therapy, and strength and range of motion with therapeutic exercise  3/0[NT1.1C]3[NT1.1M]/[NT1.2M]17)  3. Patient will increase active range of motion to 160 degrees flexion, and 165 degrees abduction to allow patients to perform functional tasks with minimal difficulty (working on soft tissue restrictions with manual therapy, and range of motion and strength with therapeutic exercise 3/0[NT1.1C]3[NT1.1M]/17)  4. Patient will be able to reach to T7 to clasp bra (working on soft tissue restrictions with manual therapy, and range of motion and strength with therapeutic exercise 3/0[NT1.1C]3[NT1.1M]/17)    Long Term Goals (to be achieved in 4 weeks  1. Patient will demonstrate improved ability to stabilize scapula with active motions for improved quality of motion and decreased  Shingrix- shingles shot. Healthy diet. Stay active. Call dermatologist for evaluation.   Follow-up with allergist. impingement to perform functional tasks/ADLs (working on strength with therapeutic exercise 3/0[NT1.1C]3[NT1.1M]/17)  2. Patient will demonstrate increased strength of the right shoulder, elbow, wrist and hand  to 5/5 in all planes in order to complete daily activity (working on strength with therapeutic exercise 3/0[NT1.1C]3[NT1.1M]/17)  3. Patient to report being able to perform cooking tasks including cutting, stirring, lifting and reaching with minimal difficulty (working on strength and range of motion with therapeutic exercise[NT1.1C] 3/03[NT1.1M]/17)  4. Patient will lift 10 pounds from waist  to shoulder level (5) times in order to demonstrate improved functional strength and mobility to complete daily activity[NT1.1C] (working on strength with therapeutic exercise 3/03/17)[NT1.1M]  5. Patient to carry 35 pounds for 50 feet with right upper extremity with good mechanics to show improved functional strength to completed daily activity (working on strength with therapeutic exercise 3/0[NT1.1C]3[NT1.1M]/17)  6. Patient to lift a 20# box from waist to shoulder level 5 times with bilateral upper extremity to show improved functional strength[NT1.1C] (working on strength with therapeutic exercise 3/03/17)[NT1.1M]      Observation/Posture:   Winging bilateral scapula - scapular setting is painful on the right.     Range of Motion:   Cervical spine range of motion   Flexion is normal  Extension is normal - pain in the neck and shoulder  Rotation right and left is normal and pain free  Side bending right and left is normal and pain free    Active shoulder range of motion in degrees (*=pain):   Right  2/16/2017  Left  2/16/2017    Flexion 140* 162   Abduction 145* discomfort 166   External Rotation 100* 100   Internal Rotation 63 66       Manual Muscle Test:   Shoulder Strength (*=pain):   Right  2/16/2017  Left  2/16/2017    Flexion 3+/5 5/5   Abduction 3+/5* 5/5   Horizontal Abduction 4/5 5/5   Horizontal Adduction  4/5 5/5   External Rotation 4+/5 5/5   Internal Rotation 5/5 5/5   Scapular retraction  3+/5* 4/5   Lower trapezius 3+/5* 4/5     Elbow left is 5/5 for flexion and extension   Elbow right biceps is 4+/5,  Triceps is 5/5    Wrist flexion and extension on the right are 5/5 and painful.  Fingers and wrist flexion and extension are 5/5      Palpation: 3/0[NT1.1C]3[NT1.1M]/17  Tissue tightness noted in the shoulder complex with soft tissue mobilization[NT1.1C] - trigger point in scapula less today with soft tissue mobilization[NT1.1M]   Tightness and pain over the lateral epicondyle and wrist extensor group continues with soft tissue mobilization     Special tests:  Speed's test right: Negativesupraspinatus,  Biceps positive for minimal pain   Yegason's test right: Positive  Neer's impingement test right: Positive  Rodriguez-Ramírez test right: Positive:  Modified Positive   Empty can test right: Positive  Cross over impingement test right  Positive   Subscapularis impingement test right  Positive     Elbow valgus stress test is negative right  Elbow varus stress test is positive outside of elbow right       TREATMENT TODAY:   Time in:[NT1.1C]  9:00[NT1.1M]     Time out:[NT1.1C] 10:14[NT1.2M]    Modalities - Electrical Stimulation to decrease pain and reduce inflammation:  x 1 units - 15 minutes or 69620 (unattended non-Medicare) or 32269 (non-wound unattended Medicare) interferential treatment 100% scan intensity as tolerated  Cold  pack to decrease pain and reduce inflammation:  09571 - 15 minutes - shoulder, elbow and wrist    Manual Therapy to increase joint mobility, improve circulation,  increase range of motion, decrease myofascial tightness and improve soft tissue and joint mobility:  97140 x 1 units -  1[NT1.1C]5[NT1.3M] minutes  Soft tissue mobilization to the right shoulder complex, right wrist extensor group and tendons[NT1.1C]  Scapular mobility - scapular distraction[NT1.1M]     Therapeutic Exercise to  increase range of motion, increase strength and instruct in a home exercise program:  77434 x[NT1.1C] 3[NT1.2M] units -[NT1.1C]  4[NT1.2M]0[NT1.3M] m[NT1.1C]in[NT1.2M]utes    Passive range of motion for the right shoulder     Foam roll thoracic spine roll x 20   Speed pulley high rows x 25 20# level 25  Bilateral external rotation with Wilton band x 30 orange  Lateral wall walks x 2 chest and head level orange band   LAX scapula 3 minutes   Rows 30# x 30  Horizontal abduction x 20 orange band[NT1.1C] - defer[NT1.2M]  Speed pulley extensions x 30 20#[NT1.1C]  Swiss Ball I/Y/T 3# 2 x 8 each  Foam roller on wall roll up with theraband ( carry) 2 x 15[NT1.2M]      Home exercise program (last updated (2/16/2017): Patient issued written home exercise program.  Patient demonstrated exercises correctly and was instructed to call with questions.   Exercises to be done 1-2 times a day.  Ice 3 times a day 15 to 20 minutes  Band external rotation 3 x 15  Band internal rotation 3 x 15  Band rows 3 x 15  Band lat pull downs 3 x 15  Band horizontal abduction 3 x 15  Shoulder flexion against the wall 3 x 15  Prone scapular retraction 3 x 10  Prone scapular retraction with shoulder flexion 3 x 10    ASSESSMENT/TREATMENT RESPONSE:    Patient's response to treatment: Tissue tightness in the right shoulder and elbow complex soften with soft tissue mobilization.[NT1.1C]  Less trigger point noted in the right scapula today.  Still tightness and tenderness in the right wrist extensor group.  Less pain over the lateral epicondyle.  Electrical stimulation to the right elbow today.[NT1.1M]     Functional improvement noted: Progressing steady with clinic exercise -[NT1.1C] added Swiss ball, I/Y/T and foam roller on wall band exercise - tolerated well.[NT1.2M]      PLAN:  Continue with the plan of care - continue with manual therapy, continue with therapeutic exercise, continue with modalities[NT1.1C]   Re-assess next session[NT1.2M]      Therapist Signature:[NT1.1C] Electronically Signed by:    Sea Doan PT , 3/3/2017[NT1.4T]                  Revision History        User Key Date/Time User Provider Type Action    > NT1.3 03/03/17 10:38 AM Sea Doan, PT Physical Therapist Sign     NT1.2 03/03/17 10:34 AM Sea Doan, PT Physical Therapist      NT1.4 03/03/17 09:26 AM Sea Doan, PT Physical Therapist      NT1.1 03/03/17 09:00 AM Sea Doan, PT Physical Therapist     C - Copied, M - Manual, T - Template

## 2019-06-13 NOTE — PROGRESS NOTES
Jaycob Reyna is a 60year old male who presents for a complete physical exam.   HPI:   Pt complains has multiple nevi some of those changing would like to see  dermatologist.  Patient seeing allergist regularly. On Allegra-D and Dymista.   Would like 07/30/2015 38   02/18/2015 31   11/20/2014 37      PSA (ng/mL)   Date Value   03/07/2018 0.79   02/22/2017 0.818   02/18/2016 0.857     No results found for: GLUCOSE      Current Outpatient Medications:  Sildenafil Citrate 50 MG Oral Tab Take 1 tablet (5 Diet: watches calories closely     REVIEW OF SYSTEMS:   GENERAL: feels well otherwise  SKIN: denies any unusual skin lesions  EYES:denies blurred vision or double vision  HEENT: denies nasal congestion, sinus pain or ST  LUNGS: denies shortness of breath w ordered in this encounter     Healthy diet. Stay active. Call dermatologist for evaluation. Call allergist for follow-up. Imaging & Consults:  DERM - INTERNAL  ALLERGY - INTERNAL     Pt's weight is Body mass index is 29.99 kg/m². , recommended low carb

## 2019-06-25 PROCEDURE — 88305 TISSUE EXAM BY PATHOLOGIST: CPT | Performed by: DERMATOLOGY

## 2019-06-25 PROCEDURE — 88342 IMHCHEM/IMCYTCHM 1ST ANTB: CPT | Performed by: DERMATOLOGY

## 2019-06-26 ENCOUNTER — LAB REQUISITION (OUTPATIENT)
Dept: LAB | Facility: HOSPITAL | Age: 59
End: 2019-06-26
Payer: COMMERCIAL

## 2019-06-26 ENCOUNTER — APPOINTMENT (OUTPATIENT)
Dept: LAB | Age: 59
End: 2019-06-26
Attending: DERMATOLOGY
Payer: COMMERCIAL

## 2019-06-26 DIAGNOSIS — D49.2 NEOPLASM OF UNSPECIFIED BEHAVIOR OF BONE, SOFT TISSUE, AND SKIN: ICD-10-CM

## 2019-07-03 ENCOUNTER — MED REC SCAN ONLY (OUTPATIENT)
Dept: FAMILY MEDICINE CLINIC | Facility: CLINIC | Age: 59
End: 2019-07-03

## 2019-10-14 ENCOUNTER — IMMUNIZATION (OUTPATIENT)
Dept: FAMILY MEDICINE CLINIC | Facility: CLINIC | Age: 59
End: 2019-10-14

## 2019-10-14 DIAGNOSIS — Z23 NEED FOR VACCINATION: ICD-10-CM

## 2019-10-14 PROCEDURE — 90686 IIV4 VACC NO PRSV 0.5 ML IM: CPT | Performed by: FAMILY MEDICINE

## 2019-10-14 PROCEDURE — 90471 IMMUNIZATION ADMIN: CPT | Performed by: FAMILY MEDICINE

## 2020-06-10 ENCOUNTER — TELEPHONE (OUTPATIENT)
Dept: FAMILY MEDICINE CLINIC | Facility: CLINIC | Age: 60
End: 2020-06-10

## 2020-06-10 DIAGNOSIS — E78.00 PURE HYPERCHOLESTEROLEMIA: ICD-10-CM

## 2020-06-10 DIAGNOSIS — N52.9 ERECTILE DYSFUNCTION, UNSPECIFIED ERECTILE DYSFUNCTION TYPE: ICD-10-CM

## 2020-06-10 DIAGNOSIS — E55.9 VITAMIN D DEFICIENCY: ICD-10-CM

## 2020-06-10 DIAGNOSIS — Z12.5 ENCOUNTER FOR SCREENING FOR MALIGNANT NEOPLASM OF PROSTATE: ICD-10-CM

## 2020-06-10 DIAGNOSIS — Z00.00 ROUTINE GENERAL MEDICAL EXAMINATION AT A HEALTH CARE FACILITY: Primary | ICD-10-CM

## 2020-06-18 ENCOUNTER — LAB ENCOUNTER (OUTPATIENT)
Dept: LAB | Age: 60
End: 2020-06-18
Attending: FAMILY MEDICINE
Payer: COMMERCIAL

## 2020-06-18 DIAGNOSIS — N52.9 ERECTILE DYSFUNCTION, UNSPECIFIED ERECTILE DYSFUNCTION TYPE: ICD-10-CM

## 2020-06-18 DIAGNOSIS — E55.9 VITAMIN D DEFICIENCY: ICD-10-CM

## 2020-06-18 DIAGNOSIS — Z12.5 ENCOUNTER FOR SCREENING FOR MALIGNANT NEOPLASM OF PROSTATE: ICD-10-CM

## 2020-06-18 DIAGNOSIS — Z00.00 ROUTINE GENERAL MEDICAL EXAMINATION AT A HEALTH CARE FACILITY: ICD-10-CM

## 2020-06-18 DIAGNOSIS — E78.00 PURE HYPERCHOLESTEROLEMIA: ICD-10-CM

## 2020-06-18 PROCEDURE — 80061 LIPID PANEL: CPT

## 2020-06-18 PROCEDURE — 84153 ASSAY OF PSA TOTAL: CPT

## 2020-06-18 PROCEDURE — 36415 COLL VENOUS BLD VENIPUNCTURE: CPT

## 2020-06-18 PROCEDURE — 82306 VITAMIN D 25 HYDROXY: CPT

## 2020-06-18 PROCEDURE — 80053 COMPREHEN METABOLIC PANEL: CPT

## 2020-06-18 PROCEDURE — 81003 URINALYSIS AUTO W/O SCOPE: CPT

## 2020-06-18 PROCEDURE — 84443 ASSAY THYROID STIM HORMONE: CPT

## 2020-06-18 PROCEDURE — 85025 COMPLETE CBC W/AUTO DIFF WBC: CPT

## 2020-07-06 ENCOUNTER — OFFICE VISIT (OUTPATIENT)
Dept: FAMILY MEDICINE CLINIC | Facility: CLINIC | Age: 60
End: 2020-07-06

## 2020-07-06 VITALS
SYSTOLIC BLOOD PRESSURE: 118 MMHG | DIASTOLIC BLOOD PRESSURE: 60 MMHG | HEART RATE: 93 BPM | HEIGHT: 71 IN | TEMPERATURE: 97 F | BODY MASS INDEX: 29.68 KG/M2 | OXYGEN SATURATION: 98 % | WEIGHT: 212 LBS | RESPIRATION RATE: 18 BRPM

## 2020-07-06 DIAGNOSIS — Z12.11 SCREENING FOR COLON CANCER: ICD-10-CM

## 2020-07-06 DIAGNOSIS — Z00.00 LABORATORY EXAMINATION ORDERED AS PART OF A ROUTINE GENERAL MEDICAL EXAMINATION: ICD-10-CM

## 2020-07-06 DIAGNOSIS — Z00.00 PHYSICAL EXAM, ANNUAL: Primary | ICD-10-CM

## 2020-07-06 PROCEDURE — 99396 PREV VISIT EST AGE 40-64: CPT | Performed by: FAMILY MEDICINE

## 2020-07-06 RX ORDER — SILDENAFIL 50 MG/1
50 TABLET, FILM COATED ORAL
Qty: 30 TABLET | Refills: 1 | Status: SHIPPED | OUTPATIENT
Start: 2020-07-06

## 2020-07-06 RX ORDER — FLUTICASONE PROPIONATE 50 MCG
1 SPRAY, SUSPENSION (ML) NASAL DAILY
COMMUNITY
Start: 2020-05-20

## 2020-07-06 RX ORDER — ASPIRIN 81 MG/1
1 TABLET ORAL DAILY
COMMUNITY
Start: 2019-03-01

## 2020-07-06 NOTE — PROGRESS NOTES
Jaycob Reyna is a 63year old male who presents for a complete physical exam.   HPI:   Pt has no complains. Would like refill of Viagra.   Immunization History  Administered            Date(s) Administered    >= 3 Yrs, Influenza Vaccine,(46043),Flu C 11/20/2014 23     ALT (U/L)   Date Value   06/18/2020 30   06/06/2019 36   12/13/2018 34   07/30/2015 38   02/18/2015 31   11/20/2014 37      PSA (ng/mL)   Date Value   06/18/2020 1.06   03/07/2018 0.79   02/22/2017 0.818     No results found for: GLUCOS Use      Smoking status: Never Smoker      Smokeless tobacco: Never Used    Alcohol use:  Yes      Alcohol/week: 0.0 standard drinks      Frequency: 2-4 times a month      Drinks per session: 1 or 2      Binge frequency: Never      Comment: occ    Drug use: 63year old male who presents for a complete physical exam.   Physical exam, annual  (primary encounter diagnosis)  Laboratory examination ordered as part of a routine general medical examination  Screening for colon cancer    No orders of the defined type

## 2021-05-21 ENCOUNTER — TELEPHONE (OUTPATIENT)
Dept: FAMILY MEDICINE CLINIC | Facility: CLINIC | Age: 61
End: 2021-05-21

## 2021-05-21 DIAGNOSIS — Z00.00 LABORATORY EXAMINATION ORDERED AS PART OF A ROUTINE GENERAL MEDICAL EXAMINATION: Primary | ICD-10-CM

## 2021-05-21 DIAGNOSIS — E55.9 VITAMIN D DEFICIENCY: ICD-10-CM

## 2021-05-21 DIAGNOSIS — E78.00 PURE HYPERCHOLESTEROLEMIA: ICD-10-CM

## 2021-07-09 ENCOUNTER — LAB ENCOUNTER (OUTPATIENT)
Dept: LAB | Age: 61
End: 2021-07-09
Attending: FAMILY MEDICINE
Payer: COMMERCIAL

## 2021-07-09 DIAGNOSIS — E55.9 VITAMIN D DEFICIENCY: ICD-10-CM

## 2021-07-09 DIAGNOSIS — E78.00 PURE HYPERCHOLESTEROLEMIA: ICD-10-CM

## 2021-07-09 DIAGNOSIS — Z00.00 LABORATORY EXAMINATION ORDERED AS PART OF A ROUTINE GENERAL MEDICAL EXAMINATION: ICD-10-CM

## 2021-07-09 LAB
ALBUMIN SERPL-MCNC: 4 G/DL (ref 3.4–5)
ALBUMIN/GLOB SERPL: 1.3 {RATIO} (ref 1–2)
ALP LIVER SERPL-CCNC: 64 U/L
ALT SERPL-CCNC: 35 U/L
ANION GAP SERPL CALC-SCNC: 4 MMOL/L (ref 0–18)
AST SERPL-CCNC: 18 U/L (ref 15–37)
BASOPHILS # BLD AUTO: 0.05 X10(3) UL (ref 0–0.2)
BASOPHILS NFR BLD AUTO: 1.2 %
BILIRUB SERPL-MCNC: 0.8 MG/DL (ref 0.1–2)
BILIRUB UR QL STRIP.AUTO: NEGATIVE
BUN BLD-MCNC: 13 MG/DL (ref 7–18)
BUN/CREAT SERPL: 13.8 (ref 10–20)
CALCIUM BLD-MCNC: 8.7 MG/DL (ref 8.5–10.1)
CHLORIDE SERPL-SCNC: 107 MMOL/L (ref 98–112)
CHOLEST SMN-MCNC: 192 MG/DL (ref ?–200)
CLARITY UR REFRACT.AUTO: CLEAR
CO2 SERPL-SCNC: 27 MMOL/L (ref 21–32)
COLOR UR AUTO: YELLOW
CREAT BLD-MCNC: 0.94 MG/DL
DEPRECATED RDW RBC AUTO: 39 FL (ref 35.1–46.3)
EOSINOPHIL # BLD AUTO: 0.3 X10(3) UL (ref 0–0.7)
EOSINOPHIL NFR BLD AUTO: 6.9 %
ERYTHROCYTE [DISTWIDTH] IN BLOOD BY AUTOMATED COUNT: 12.2 % (ref 11–15)
GLOBULIN PLAS-MCNC: 3 G/DL (ref 2.8–4.4)
GLUCOSE BLD-MCNC: 96 MG/DL (ref 70–99)
GLUCOSE UR STRIP.AUTO-MCNC: NEGATIVE MG/DL
HCT VFR BLD AUTO: 42.7 %
HDLC SERPL-MCNC: 48 MG/DL (ref 40–59)
HGB BLD-MCNC: 15.2 G/DL
IMM GRANULOCYTES # BLD AUTO: 0.01 X10(3) UL (ref 0–1)
IMM GRANULOCYTES NFR BLD: 0.2 %
KETONES UR STRIP.AUTO-MCNC: NEGATIVE MG/DL
LDLC SERPL CALC-MCNC: 126 MG/DL (ref ?–100)
LEUKOCYTE ESTERASE UR QL STRIP.AUTO: NEGATIVE
LYMPHOCYTES # BLD AUTO: 1.25 X10(3) UL (ref 1–4)
LYMPHOCYTES NFR BLD AUTO: 28.9 %
M PROTEIN MFR SERPL ELPH: 7 G/DL (ref 6.4–8.2)
MCH RBC QN AUTO: 31.1 PG (ref 26–34)
MCHC RBC AUTO-ENTMCNC: 35.6 G/DL (ref 31–37)
MCV RBC AUTO: 87.3 FL
MONOCYTES # BLD AUTO: 0.34 X10(3) UL (ref 0.1–1)
MONOCYTES NFR BLD AUTO: 7.9 %
NEUTROPHILS # BLD AUTO: 2.38 X10 (3) UL (ref 1.5–7.7)
NEUTROPHILS # BLD AUTO: 2.38 X10(3) UL (ref 1.5–7.7)
NEUTROPHILS NFR BLD AUTO: 54.9 %
NITRITE UR QL STRIP.AUTO: NEGATIVE
NONHDLC SERPL-MCNC: 144 MG/DL (ref ?–130)
OSMOLALITY SERPL CALC.SUM OF ELEC: 286 MOSM/KG (ref 275–295)
PATIENT FASTING Y/N/NP: YES
PATIENT FASTING Y/N/NP: YES
PH UR STRIP.AUTO: 6 [PH] (ref 5–8)
PLATELET # BLD AUTO: 238 10(3)UL (ref 150–450)
POTASSIUM SERPL-SCNC: 4.5 MMOL/L (ref 3.5–5.1)
PROT UR STRIP.AUTO-MCNC: NEGATIVE MG/DL
PSA SERPL-MCNC: 0.82 NG/ML (ref ?–4)
RBC # BLD AUTO: 4.89 X10(6)UL
RBC UR QL AUTO: NEGATIVE
SODIUM SERPL-SCNC: 138 MMOL/L (ref 136–145)
SP GR UR STRIP.AUTO: 1.01 (ref 1–1.03)
TRIGL SERPL-MCNC: 100 MG/DL (ref 30–149)
TSI SER-ACNC: 1.14 MIU/ML (ref 0.36–3.74)
UROBILINOGEN UR STRIP.AUTO-MCNC: <2 MG/DL
VIT D+METAB SERPL-MCNC: 59.9 NG/ML (ref 30–100)
VLDLC SERPL CALC-MCNC: 18 MG/DL (ref 0–30)
WBC # BLD AUTO: 4.3 X10(3) UL (ref 4–11)

## 2021-07-09 PROCEDURE — 80053 COMPREHEN METABOLIC PANEL: CPT

## 2021-07-09 PROCEDURE — 81003 URINALYSIS AUTO W/O SCOPE: CPT

## 2021-07-09 PROCEDURE — 36415 COLL VENOUS BLD VENIPUNCTURE: CPT

## 2021-07-09 PROCEDURE — 85025 COMPLETE CBC W/AUTO DIFF WBC: CPT

## 2021-07-09 PROCEDURE — 84443 ASSAY THYROID STIM HORMONE: CPT

## 2021-07-09 PROCEDURE — 84153 ASSAY OF PSA TOTAL: CPT

## 2021-07-09 PROCEDURE — 80061 LIPID PANEL: CPT

## 2021-07-09 PROCEDURE — 82306 VITAMIN D 25 HYDROXY: CPT

## 2021-07-22 ENCOUNTER — OFFICE VISIT (OUTPATIENT)
Dept: FAMILY MEDICINE CLINIC | Facility: CLINIC | Age: 61
End: 2021-07-22

## 2021-07-22 ENCOUNTER — TELEPHONE (OUTPATIENT)
Dept: ORTHOPEDICS CLINIC | Facility: CLINIC | Age: 61
End: 2021-07-22

## 2021-07-22 VITALS
TEMPERATURE: 98 F | DIASTOLIC BLOOD PRESSURE: 60 MMHG | SYSTOLIC BLOOD PRESSURE: 110 MMHG | RESPIRATION RATE: 14 BRPM | HEIGHT: 72 IN | WEIGHT: 196 LBS | HEART RATE: 72 BPM | BODY MASS INDEX: 26.55 KG/M2

## 2021-07-22 DIAGNOSIS — Z00.00 PHYSICAL EXAM, ANNUAL: Primary | ICD-10-CM

## 2021-07-22 DIAGNOSIS — Z88.9 MULTIPLE ALLERGIES: ICD-10-CM

## 2021-07-22 DIAGNOSIS — Z12.11 SCREEN FOR COLON CANCER: ICD-10-CM

## 2021-07-22 DIAGNOSIS — R06.83 SNORING: ICD-10-CM

## 2021-07-22 DIAGNOSIS — M79.645 PAIN OF LEFT THUMB: ICD-10-CM

## 2021-07-22 DIAGNOSIS — R06.81 APNEA: ICD-10-CM

## 2021-07-22 PROCEDURE — 90750 HZV VACC RECOMBINANT IM: CPT | Performed by: FAMILY MEDICINE

## 2021-07-22 PROCEDURE — 90471 IMMUNIZATION ADMIN: CPT | Performed by: FAMILY MEDICINE

## 2021-07-22 PROCEDURE — 99396 PREV VISIT EST AGE 40-64: CPT | Performed by: FAMILY MEDICINE

## 2021-07-22 PROCEDURE — 3074F SYST BP LT 130 MM HG: CPT | Performed by: FAMILY MEDICINE

## 2021-07-22 PROCEDURE — 99214 OFFICE O/P EST MOD 30 MIN: CPT | Performed by: FAMILY MEDICINE

## 2021-07-22 PROCEDURE — 3008F BODY MASS INDEX DOCD: CPT | Performed by: FAMILY MEDICINE

## 2021-07-22 PROCEDURE — 3078F DIAST BP <80 MM HG: CPT | Performed by: FAMILY MEDICINE

## 2021-07-22 RX ORDER — AZELASTINE 1 MG/ML
1 SPRAY, METERED NASAL 2 TIMES DAILY
COMMUNITY

## 2021-07-22 NOTE — PATIENT INSTRUCTIONS
Healthy diet. Stay active. Call GI doctor for colonoscopy. Do sleep study. See orthopedic doctor for evaluation. Call [phone] to schedule x-ray of your left hand.

## 2021-07-22 NOTE — PROGRESS NOTES
Rizwana Purcell is a 57year old male who presents for a complete physical exam I would like to discuss thumb pain, sleep apnea/snoring, allergies  HPI:   Pt has noticed worsening pain in the left thumb.   His hand  is significantly decreased cannot 02/27/2013      Zoster Vaccine Recombinant Adjuvanted (Shingrix)                          07/22/2021    Wt Readings from Last 6 Encounters:  07/22/21 : 196 lb (88.9 kg)  07/06/20 : 212 lb (96.2 kg)  06/13/19 : 215 lb (97.5 kg)  02/24/19 : 205 l Azelastine-Fluticasone (DYMISTA) 137-50 MCG/ACT Nasal Suspension by Nasal route. • Cetirizine-Pseudoephedrine (ALLERGY D-12 OR) Take  by mouth. • Omega-3 Fatty Acids (FISH OIL OR) by Does not apply route.      • Multiple Vitamin (MULTIVITAMIN OR) Ta nocturia or changes in stream  MUSCULOSKELETAL: denies back pain  NEURO: denies headaches  PSYCHE: denies depression or anxiety  HEMATOLOGIC: denies hx of anemia  ENDOCRINE: denies thyroid history  ALL/ASTHMA: denies hx of allergy or asthma    EXAM:   BP 1 REFERRAL TO DIAGNOSTIC SLEEP STUDY  XR HAND (MIN 3 VIEWS), LEFT (CPT=15821)     Pt's weight is Body mass index is 26.58 kg/m². , recommended low carb diet and aerobic exercise 30 minutes three times weekly.  Health maintenance, will check fasting Lipids, CMP

## 2021-07-22 NOTE — TELEPHONE ENCOUNTER
Future Appointments   Date Time Provider Vandana Morales   8/27/2021 11:30 AM Perla Wasserman MD [facility]       Patient is coming for LT Hand Pain. Do we need views for patient's appt. ? Please advise. Thanks.     Patient can be reached at

## 2021-07-22 NOTE — TELEPHONE ENCOUNTER
• Reviewed patients chart, xray orders are required. Order in place from PCP.    • Spoke to patient who states he will complete the orders that were ordered by his PCP  Future Appointments   Date Time Provider Vandana Morales   8/10/2021  9:30 AM Katja Hoff,

## 2021-08-07 ENCOUNTER — HOSPITAL ENCOUNTER (OUTPATIENT)
Dept: GENERAL RADIOLOGY | Age: 61
Discharge: HOME | End: 2021-08-07
Attending: FAMILY MEDICINE
Payer: COMMERCIAL

## 2021-08-07 ENCOUNTER — LAB ENCOUNTER (OUTPATIENT)
Dept: LAB | Age: 61
End: 2021-08-07
Attending: INTERNAL MEDICINE
Payer: COMMERCIAL

## 2021-08-07 DIAGNOSIS — M79.645 PAIN OF LEFT THUMB: ICD-10-CM

## 2021-08-07 DIAGNOSIS — Z01.818 PRE-OP TESTING: ICD-10-CM

## 2021-08-07 PROCEDURE — 73130 X-RAY EXAM OF HAND: CPT | Performed by: FAMILY MEDICINE

## 2021-08-08 LAB — SARS-COV-2 RNA RESP QL NAA+PROBE: NOT DETECTED

## 2021-08-10 PROBLEM — Z86.0101 HISTORY OF ADENOMATOUS POLYP OF COLON: Status: ACTIVE | Noted: 2021-08-10

## 2021-08-10 PROBLEM — Z86.010 HISTORY OF ADENOMATOUS POLYP OF COLON: Status: ACTIVE | Noted: 2021-08-10

## 2021-08-10 PROBLEM — K63.5 POLYP OF COLON: Status: ACTIVE | Noted: 2021-08-10

## 2021-08-10 PROCEDURE — 88305 TISSUE EXAM BY PATHOLOGIST: CPT | Performed by: INTERNAL MEDICINE

## 2021-08-12 ENCOUNTER — PATIENT MESSAGE (OUTPATIENT)
Dept: FAMILY MEDICINE CLINIC | Facility: CLINIC | Age: 61
End: 2021-08-12

## 2021-08-13 ENCOUNTER — PATIENT MESSAGE (OUTPATIENT)
Dept: FAMILY MEDICINE CLINIC | Facility: CLINIC | Age: 61
End: 2021-08-13

## 2021-08-13 NOTE — TELEPHONE ENCOUNTER
From: Doreen Root  To: Lisa Tubbs MD  Sent: 8/12/2021 3:41 PM CDT  Subject: Visit Follow-up Question    Good Day, at my physical I mentioned that my right knee was having some pain.  I wanted to wait until next year before addressing the prob

## 2021-08-18 ENCOUNTER — OFFICE VISIT (OUTPATIENT)
Dept: SLEEP CENTER | Age: 61
End: 2021-08-18
Attending: FAMILY MEDICINE
Payer: COMMERCIAL

## 2021-08-18 DIAGNOSIS — R06.81 APNEA: ICD-10-CM

## 2021-08-18 DIAGNOSIS — R06.83 SNORING: ICD-10-CM

## 2021-08-18 PROCEDURE — 95810 POLYSOM 6/> YRS 4/> PARAM: CPT

## 2021-08-24 NOTE — TELEPHONE ENCOUNTER
Dr. Yolanda Padron to advise (messages below)      \"Nirav Jain MA  to [facility]    10:35 AM  Flex Padron is recommending to come in for a evaluation of the knee so she can order the proper testing and you will have

## 2021-08-26 ENCOUNTER — OFFICE VISIT (OUTPATIENT)
Dept: FAMILY MEDICINE CLINIC | Facility: CLINIC | Age: 61
End: 2021-08-26

## 2021-08-26 VITALS
HEIGHT: 72 IN | BODY MASS INDEX: 25.87 KG/M2 | RESPIRATION RATE: 18 BRPM | OXYGEN SATURATION: 98 % | HEART RATE: 68 BPM | DIASTOLIC BLOOD PRESSURE: 72 MMHG | WEIGHT: 191 LBS | TEMPERATURE: 98 F | SYSTOLIC BLOOD PRESSURE: 116 MMHG

## 2021-08-26 DIAGNOSIS — M25.561 RIGHT KNEE PAIN, UNSPECIFIED CHRONICITY: Primary | ICD-10-CM

## 2021-08-26 PROCEDURE — 3008F BODY MASS INDEX DOCD: CPT | Performed by: FAMILY MEDICINE

## 2021-08-26 PROCEDURE — 3078F DIAST BP <80 MM HG: CPT | Performed by: FAMILY MEDICINE

## 2021-08-26 PROCEDURE — 99213 OFFICE O/P EST LOW 20 MIN: CPT | Performed by: FAMILY MEDICINE

## 2021-08-26 PROCEDURE — 3074F SYST BP LT 130 MM HG: CPT | Performed by: FAMILY MEDICINE

## 2021-08-26 NOTE — PROGRESS NOTES
Ag Footman is a 57year old male. cc right knee pain   HPI:   Patient complains of having right knee pain which is  slowly worsening over the past year.   The pain was worse when he is walking changing position, it is in the medial aspect of the left breathing  at night   • Vitamin D deficiency       Social History:  Social History    Tobacco Use      Smoking status: Never Smoker      Smokeless tobacco: Never Used    Vaping Use      Vaping Use: Never used    Alcohol use:  Yes      Alcohol/week: 0.0 cristian needed. The note was dictated using speech recognition software. Accuracy and grammar in transcription may be subject to error.

## 2021-08-27 ENCOUNTER — OFFICE VISIT (OUTPATIENT)
Dept: ORTHOPEDICS CLINIC | Facility: CLINIC | Age: 61
End: 2021-08-27

## 2021-08-27 ENCOUNTER — HOSPITAL ENCOUNTER (OUTPATIENT)
Dept: GENERAL RADIOLOGY | Age: 61
Discharge: HOME | End: 2021-08-27
Attending: FAMILY MEDICINE
Payer: COMMERCIAL

## 2021-08-27 VITALS — HEART RATE: 92 BPM | OXYGEN SATURATION: 99 %

## 2021-08-27 DIAGNOSIS — M18.10 ARTHRITIS OF CARPOMETACARPAL (CMC) JOINT OF THUMB: Primary | ICD-10-CM

## 2021-08-27 DIAGNOSIS — M25.561 RIGHT KNEE PAIN, UNSPECIFIED CHRONICITY: ICD-10-CM

## 2021-08-27 PROCEDURE — 20600 DRAIN/INJ JOINT/BURSA W/O US: CPT | Performed by: ORTHOPAEDIC SURGERY

## 2021-08-27 PROCEDURE — 73564 X-RAY EXAM KNEE 4 OR MORE: CPT | Performed by: FAMILY MEDICINE

## 2021-08-27 PROCEDURE — 99203 OFFICE O/P NEW LOW 30 MIN: CPT | Performed by: ORTHOPAEDIC SURGERY

## 2021-08-27 RX ORDER — TRIAMCINOLONE ACETONIDE 40 MG/ML
40 INJECTION, SUSPENSION INTRA-ARTICULAR; INTRAMUSCULAR ONCE
Status: COMPLETED | OUTPATIENT
Start: 2021-08-27 | End: 2021-08-27

## 2021-08-27 RX ADMIN — TRIAMCINOLONE ACETONIDE 40 MG: 40 INJECTION, SUSPENSION INTRA-ARTICULAR; INTRAMUSCULAR at 11:40:00

## 2021-08-27 NOTE — H&P
Clinic Note [facility]     Assessment/Plan:  57year old male    1. Left thumb [facility] arthritis–x-rays reveal fairly advanced degenerative changes with bone-on-bone at the [facility].   Recommend brace while performing heavy manual tasks as well as a cor him up at night. He has not tried anything yet. He lives at home with his wife. Occupation: retired.     Past Medical History:   Diagnosis Date   • Arthritis    • Back pain Degenerative Disk Disease   • Chest pain on exertion 10 yrs ago   • DDD (degene • Heart Disease Father    • Heart Disorder Father         MI at 42/stent coronary   • Hypertension Father    • Lipids Father    • Other (Other) Father         arthritis   • Other (Other) Mother         bowel rupture/arthritis   • Heart Disorder Paternal G

## 2022-04-21 ENCOUNTER — TELEPHONE (OUTPATIENT)
Dept: FAMILY MEDICINE CLINIC | Facility: CLINIC | Age: 62
End: 2022-04-21

## 2022-04-22 NOTE — TELEPHONE ENCOUNTER
S/w [employer] him that orders for lab work are in place to complete prior to physical. Informed him that he needs to be fasting. Murphy Chong voiced understanding and is agreeable to plan.

## 2022-06-11 ENCOUNTER — PATIENT MESSAGE (OUTPATIENT)
Dept: FAMILY MEDICINE CLINIC | Facility: CLINIC | Age: 62
End: 2022-06-11

## 2022-06-11 DIAGNOSIS — D36.13 NEUROMA OF FOOT: Primary | ICD-10-CM

## 2022-06-14 NOTE — TELEPHONE ENCOUNTER
From: Evelio Root  To: Susana Albarran MD  Sent: 6/11/2022 9:32 AM CDT  Subject: Neuroma on Left Foot    Good Morning, the neuroma on my left foot has continued to spread. My 2nd toe in now pushing under my big toe as I walk. Would it be possible to get a referral to the Podiatrist? I would to address this before it gets any worse. Please let know thank you.  Cordelia Huber [phone]

## 2022-06-29 ENCOUNTER — OFFICE VISIT (OUTPATIENT)
Dept: ORTHOPEDICS CLINIC | Facility: CLINIC | Age: 62
End: 2022-06-29
Payer: COMMERCIAL

## 2022-06-29 VITALS — BODY MASS INDEX: 26.41 KG/M2 | WEIGHT: 195 LBS | HEIGHT: 72 IN

## 2022-06-29 DIAGNOSIS — M20.42 HAMMER TOES OF BOTH FEET: ICD-10-CM

## 2022-06-29 DIAGNOSIS — L90.9 FAT PAD ATROPHY OF FOOT: ICD-10-CM

## 2022-06-29 DIAGNOSIS — M20.41 HAMMER TOES OF BOTH FEET: ICD-10-CM

## 2022-06-29 DIAGNOSIS — Q66.70 HIGH ARCHES: ICD-10-CM

## 2022-06-29 DIAGNOSIS — D36.10 NEUROMA: Primary | ICD-10-CM

## 2022-06-29 PROCEDURE — 3008F BODY MASS INDEX DOCD: CPT | Performed by: PODIATRIST

## 2022-06-29 PROCEDURE — 99202 OFFICE O/P NEW SF 15 MIN: CPT | Performed by: PODIATRIST

## 2022-07-08 ENCOUNTER — LAB ENCOUNTER (OUTPATIENT)
Dept: LAB | Age: 62
End: 2022-07-08
Attending: FAMILY MEDICINE
Payer: COMMERCIAL

## 2022-07-08 DIAGNOSIS — Z00.00 BLOOD TESTS FOR ROUTINE GENERAL PHYSICAL EXAMINATION: ICD-10-CM

## 2022-07-08 DIAGNOSIS — R73.9 HYPERGLYCEMIA: Primary | ICD-10-CM

## 2022-07-08 DIAGNOSIS — Z13.89 SCREENING FOR GENITOURINARY CONDITION: ICD-10-CM

## 2022-07-08 DIAGNOSIS — R73.9 HYPERGLYCEMIA: ICD-10-CM

## 2022-07-08 DIAGNOSIS — E55.9 VITAMIN D DEFICIENCY: ICD-10-CM

## 2022-07-08 DIAGNOSIS — Z12.5 SCREENING FOR PROSTATE CANCER: ICD-10-CM

## 2022-07-08 LAB
ALBUMIN SERPL-MCNC: 4 G/DL (ref 3.4–5)
ALBUMIN/GLOB SERPL: 1.3 {RATIO} (ref 1–2)
ALP LIVER SERPL-CCNC: 71 U/L
ALT SERPL-CCNC: 39 U/L
ANION GAP SERPL CALC-SCNC: 6 MMOL/L (ref 0–18)
AST SERPL-CCNC: 18 U/L (ref 15–37)
BASOPHILS # BLD AUTO: 0.08 X10(3) UL (ref 0–0.2)
BASOPHILS NFR BLD AUTO: 1.6 %
BILIRUB SERPL-MCNC: 1.1 MG/DL (ref 0.1–2)
BILIRUB UR QL STRIP.AUTO: NEGATIVE
BUN BLD-MCNC: 12 MG/DL (ref 7–18)
CALCIUM BLD-MCNC: 9.2 MG/DL (ref 8.5–10.1)
CHLORIDE SERPL-SCNC: 107 MMOL/L (ref 98–112)
CHOLEST SERPL-MCNC: 190 MG/DL (ref ?–200)
CLARITY UR REFRACT.AUTO: CLEAR
CO2 SERPL-SCNC: 28 MMOL/L (ref 21–32)
COLOR UR AUTO: YELLOW
COMPLEXED PSA SERPL-MCNC: 0.91 NG/ML (ref ?–4)
CREAT BLD-MCNC: 0.99 MG/DL
EOSINOPHIL # BLD AUTO: 0.31 X10(3) UL (ref 0–0.7)
EOSINOPHIL NFR BLD AUTO: 6.2 %
ERYTHROCYTE [DISTWIDTH] IN BLOOD BY AUTOMATED COUNT: 12.1 %
EST. AVERAGE GLUCOSE BLD GHB EST-MCNC: 97 MG/DL (ref 68–126)
FASTING PATIENT LIPID ANSWER: YES
FASTING STATUS PATIENT QL REPORTED: YES
GLOBULIN PLAS-MCNC: 3.1 G/DL (ref 2.8–4.4)
GLUCOSE BLD-MCNC: 101 MG/DL (ref 70–99)
GLUCOSE UR STRIP.AUTO-MCNC: NEGATIVE MG/DL
HBA1C MFR BLD: 5 % (ref ?–5.7)
HCT VFR BLD AUTO: 43.3 %
HDLC SERPL-MCNC: 48 MG/DL (ref 40–59)
HGB BLD-MCNC: 15 G/DL
IMM GRANULOCYTES # BLD AUTO: 0.02 X10(3) UL (ref 0–1)
IMM GRANULOCYTES NFR BLD: 0.4 %
KETONES UR STRIP.AUTO-MCNC: NEGATIVE MG/DL
LDLC SERPL CALC-MCNC: 118 MG/DL (ref ?–100)
LEUKOCYTE ESTERASE UR QL STRIP.AUTO: NEGATIVE
LYMPHOCYTES # BLD AUTO: 1.59 X10(3) UL (ref 1–4)
LYMPHOCYTES NFR BLD AUTO: 31.7 %
MCH RBC QN AUTO: 31 PG (ref 26–34)
MCHC RBC AUTO-ENTMCNC: 34.6 G/DL (ref 31–37)
MCV RBC AUTO: 89.5 FL
MONOCYTES # BLD AUTO: 0.4 X10(3) UL (ref 0.1–1)
MONOCYTES NFR BLD AUTO: 8 %
NEUTROPHILS # BLD AUTO: 2.61 X10 (3) UL (ref 1.5–7.7)
NEUTROPHILS # BLD AUTO: 2.61 X10(3) UL (ref 1.5–7.7)
NEUTROPHILS NFR BLD AUTO: 52.1 %
NITRITE UR QL STRIP.AUTO: NEGATIVE
NONHDLC SERPL-MCNC: 142 MG/DL (ref ?–130)
OSMOLALITY SERPL CALC.SUM OF ELEC: 292 MOSM/KG (ref 275–295)
PH UR STRIP.AUTO: 6 [PH] (ref 5–8)
PLATELET # BLD AUTO: 257 10(3)UL (ref 150–450)
POTASSIUM SERPL-SCNC: 4.3 MMOL/L (ref 3.5–5.1)
PROT SERPL-MCNC: 7.1 G/DL (ref 6.4–8.2)
PROT UR STRIP.AUTO-MCNC: NEGATIVE MG/DL
RBC # BLD AUTO: 4.84 X10(6)UL
RBC UR QL AUTO: NEGATIVE
SODIUM SERPL-SCNC: 141 MMOL/L (ref 136–145)
SP GR UR STRIP.AUTO: 1.02 (ref 1–1.03)
TRIGL SERPL-MCNC: 134 MG/DL (ref 30–149)
TSI SER-ACNC: 1.11 MIU/ML (ref 0.36–3.74)
UROBILINOGEN UR STRIP.AUTO-MCNC: 0.2 MG/DL
VIT D+METAB SERPL-MCNC: 48 NG/ML (ref 30–100)
VLDLC SERPL CALC-MCNC: 23 MG/DL (ref 0–30)
WBC # BLD AUTO: 5 X10(3) UL (ref 4–11)

## 2022-07-08 PROCEDURE — 83036 HEMOGLOBIN GLYCOSYLATED A1C: CPT

## 2022-07-08 PROCEDURE — 84443 ASSAY THYROID STIM HORMONE: CPT

## 2022-07-08 PROCEDURE — 80061 LIPID PANEL: CPT

## 2022-07-08 PROCEDURE — 85025 COMPLETE CBC W/AUTO DIFF WBC: CPT

## 2022-07-08 PROCEDURE — 81003 URINALYSIS AUTO W/O SCOPE: CPT

## 2022-07-08 PROCEDURE — 80053 COMPREHEN METABOLIC PANEL: CPT

## 2022-07-08 PROCEDURE — 36415 COLL VENOUS BLD VENIPUNCTURE: CPT

## 2022-07-08 PROCEDURE — 82306 VITAMIN D 25 HYDROXY: CPT

## 2022-07-26 ENCOUNTER — OFFICE VISIT (OUTPATIENT)
Dept: FAMILY MEDICINE CLINIC | Facility: CLINIC | Age: 62
End: 2022-07-26
Payer: COMMERCIAL

## 2022-07-26 VITALS
HEIGHT: 72 IN | TEMPERATURE: 99 F | RESPIRATION RATE: 16 BRPM | BODY MASS INDEX: 26.68 KG/M2 | HEART RATE: 86 BPM | SYSTOLIC BLOOD PRESSURE: 106 MMHG | WEIGHT: 197 LBS | DIASTOLIC BLOOD PRESSURE: 70 MMHG

## 2022-07-26 DIAGNOSIS — Z88.9 MULTIPLE ALLERGIES: ICD-10-CM

## 2022-07-26 DIAGNOSIS — Z00.00 PHYSICAL EXAM, ANNUAL: Primary | ICD-10-CM

## 2022-07-26 DIAGNOSIS — D22.9 MULTIPLE NEVI: ICD-10-CM

## 2022-07-26 DIAGNOSIS — E55.9 VITAMIN D DEFICIENCY: ICD-10-CM

## 2022-07-26 DIAGNOSIS — Z82.49 FAMILY HISTORY OF CORONARY ARTERY DISEASE IN FATHER: ICD-10-CM

## 2022-07-26 DIAGNOSIS — R07.2 PRECORDIAL PAIN: ICD-10-CM

## 2022-07-26 DIAGNOSIS — E78.00 PURE HYPERCHOLESTEROLEMIA: ICD-10-CM

## 2022-07-26 DIAGNOSIS — D36.13 NEUROMA OF FOOT: ICD-10-CM

## 2022-07-26 DIAGNOSIS — R73.9 HYPERGLYCEMIA: ICD-10-CM

## 2022-07-26 PROCEDURE — 3008F BODY MASS INDEX DOCD: CPT | Performed by: FAMILY MEDICINE

## 2022-07-26 PROCEDURE — 3078F DIAST BP <80 MM HG: CPT | Performed by: FAMILY MEDICINE

## 2022-07-26 PROCEDURE — 3074F SYST BP LT 130 MM HG: CPT | Performed by: FAMILY MEDICINE

## 2022-07-26 PROCEDURE — 99214 OFFICE O/P EST MOD 30 MIN: CPT | Performed by: FAMILY MEDICINE

## 2022-07-26 PROCEDURE — 93000 ELECTROCARDIOGRAM COMPLETE: CPT | Performed by: FAMILY MEDICINE

## 2022-07-26 PROCEDURE — 99396 PREV VISIT EST AGE 40-64: CPT | Performed by: FAMILY MEDICINE

## 2022-07-26 NOTE — PATIENT INSTRUCTIONS
Healthy diet. Stay active. Call [phone] to schedule stress test.    Do heart scan. See dermatologist for evaluation. Try Voltaren gel topically as needed over-the-counter. See allergist for follow-up.

## 2022-07-29 ENCOUNTER — LAB ENCOUNTER (OUTPATIENT)
Dept: LAB | Age: 62
End: 2022-07-29
Attending: FAMILY MEDICINE
Payer: COMMERCIAL

## 2022-07-29 ENCOUNTER — LAB REQUISITION (OUTPATIENT)
Dept: LAB | Facility: HOSPITAL | Age: 62
End: 2022-07-29
Payer: COMMERCIAL

## 2022-07-29 DIAGNOSIS — D48.5 NEOPLASM OF UNCERTAIN BEHAVIOR OF SKIN: ICD-10-CM

## 2022-07-29 DIAGNOSIS — R07.2 PRECORDIAL PAIN: ICD-10-CM

## 2022-07-29 DIAGNOSIS — E78.00 PURE HYPERCHOLESTEROLEMIA: ICD-10-CM

## 2022-07-29 DIAGNOSIS — Z82.49 FAMILY HISTORY OF CORONARY ARTERY DISEASE IN FATHER: ICD-10-CM

## 2022-07-29 PROCEDURE — 88305 TISSUE EXAM BY PATHOLOGIST: CPT | Performed by: PHYSICIAN ASSISTANT

## 2022-07-30 LAB — SARS-COV-2 RNA RESP QL NAA+PROBE: NOT DETECTED

## 2022-08-01 ENCOUNTER — HOSPITAL ENCOUNTER (OUTPATIENT)
Dept: CV DIAGNOSTICS | Facility: HOSPITAL | Age: 62
Discharge: HOME | End: 2022-08-01
Attending: FAMILY MEDICINE
Payer: COMMERCIAL

## 2022-08-01 DIAGNOSIS — E78.00 PURE HYPERCHOLESTEROLEMIA: ICD-10-CM

## 2022-08-01 DIAGNOSIS — Z82.49 FAMILY HISTORY OF CORONARY ARTERY DISEASE IN FATHER: ICD-10-CM

## 2022-08-01 DIAGNOSIS — R07.2 PRECORDIAL PAIN: ICD-10-CM

## 2022-08-01 PROCEDURE — 93350 STRESS TTE ONLY: CPT | Performed by: FAMILY MEDICINE

## 2022-08-01 PROCEDURE — 93018 CV STRESS TEST I&R ONLY: CPT | Performed by: FAMILY MEDICINE

## 2022-08-01 PROCEDURE — 93017 CV STRESS TEST TRACING ONLY: CPT | Performed by: FAMILY MEDICINE

## 2022-09-06 ENCOUNTER — TELEPHONE (OUTPATIENT)
Dept: FAMILY MEDICINE CLINIC | Facility: CLINIC | Age: 62
End: 2022-09-06

## 2022-09-06 RX ORDER — CYCLOBENZAPRINE HCL 10 MG
TABLET ORAL
Qty: 20 TABLET | Refills: 0 | Status: SHIPPED | OUTPATIENT
Start: 2022-09-06

## 2022-09-06 NOTE — TELEPHONE ENCOUNTER
We will call cyclobenzaprine 10 mg tablet at bedtime #20 no refills. It could make you sleepy no driving when taking this medication. I would like him to give us a call tomorrow with update we may work him in on Thursday or Friday this week to be seen.   Thank you

## 2022-09-06 NOTE — TELEPHONE ENCOUNTER
S/W pt and he states can't get up to go to IC due to pain. Has taken Tylenol #3 without much relief and requesting muscle relaxant. States Naprosyn or Flexeril usually helps. States left low back pain x 1 1/2 weeks , but has worsen today.

## 2022-09-08 RX ORDER — NAPROXEN 500 MG/1
500 TABLET ORAL 2 TIMES DAILY WITH MEALS
Qty: 20 TABLET | Refills: 0 | Status: SHIPPED | OUTPATIENT
Start: 2022-09-08

## 2022-09-08 NOTE — TELEPHONE ENCOUNTER
Pt and pt's wife informed of below and they both voiced understanding. Per wife if he is not much better tomorrow, she will call an ambulance for him to go to ER. FYI-per wife pt is taking Cyclobenzaprine 10 BID.

## 2022-09-08 NOTE — TELEPHONE ENCOUNTER
We could add naproxen 500 mg 1 tablet twice a day to the regimen number 20 tablets, she can try over-the-counter Biofreeze or icy hot warm compresses. Use muscle relaxant. Monitor symptoms. If his symptoms  would improve we may see him in the office for evaluation  and decide if he would benefit from additional evaluation and treatment. At any point if worsening symptoms proceed to urgent care or ER for evaluation.   Thank you

## 2022-09-08 NOTE — TELEPHONE ENCOUNTER
Noted.Yes, if not better agree with ambulnce taking him to ER- need to be checked. At any point if worse- go right away. Thanks.

## 2022-09-08 NOTE — TELEPHONE ENCOUNTER
Received live call from patient   He is still having trouble standing up, \"I really do not think I could get into the office\"  He states he is unable to walk or stand up straight    Flexeril is \"doing okay\"  He believes he better than before, but \"not where he needs to be\"    Reiterated that  wanted pt to proceed to UC on 9/6/22 for evaluation of symptoms, but pt refused d/t pain. Discussed that as his symptoms have not significantly improved, he should proceed to UC as he was instructed on 9/6    Pt began to laugh and restate that he has difficulty moving and will not be able to go to UC.  He would like feedback from

## 2022-09-09 ENCOUNTER — APPOINTMENT (OUTPATIENT)
Dept: GENERAL RADIOLOGY | Facility: HOSPITAL | Age: 62
End: 2022-09-09
Attending: EMERGENCY MEDICINE
Payer: COMMERCIAL

## 2022-09-09 ENCOUNTER — HOSPITAL ENCOUNTER (EMERGENCY)
Facility: HOSPITAL | Age: 62
Discharge: HOME | End: 2022-09-09
Attending: EMERGENCY MEDICINE
Payer: COMMERCIAL

## 2022-09-09 ENCOUNTER — TELEPHONE (OUTPATIENT)
Dept: FAMILY MEDICINE CLINIC | Facility: CLINIC | Age: 62
End: 2022-09-09

## 2022-09-09 VITALS
TEMPERATURE: 98 F | BODY MASS INDEX: 27.09 KG/M2 | OXYGEN SATURATION: 97 % | RESPIRATION RATE: 15 BRPM | SYSTOLIC BLOOD PRESSURE: 140 MMHG | WEIGHT: 200 LBS | DIASTOLIC BLOOD PRESSURE: 88 MMHG | HEIGHT: 72 IN | HEART RATE: 87 BPM

## 2022-09-09 DIAGNOSIS — M54.9 MODERATE BACK PAIN: Primary | ICD-10-CM

## 2022-09-09 LAB
ALBUMIN SERPL-MCNC: 3.8 G/DL (ref 3.4–5)
ALBUMIN/GLOB SERPL: 1.3 {RATIO} (ref 1–2)
ALP LIVER SERPL-CCNC: 64 U/L
ALT SERPL-CCNC: 55 U/L
ANION GAP SERPL CALC-SCNC: 4 MMOL/L (ref 0–18)
AST SERPL-CCNC: 26 U/L (ref 15–37)
BASOPHILS # BLD AUTO: 0.04 X10(3) UL (ref 0–0.2)
BASOPHILS NFR BLD AUTO: 0.7 %
BILIRUB SERPL-MCNC: 0.8 MG/DL (ref 0.1–2)
BUN BLD-MCNC: 17 MG/DL (ref 7–18)
CALCIUM BLD-MCNC: 8.7 MG/DL (ref 8.5–10.1)
CHLORIDE SERPL-SCNC: 108 MMOL/L (ref 98–112)
CO2 SERPL-SCNC: 27 MMOL/L (ref 21–32)
CREAT BLD-MCNC: 0.78 MG/DL
EOSINOPHIL # BLD AUTO: 0.17 X10(3) UL (ref 0–0.7)
EOSINOPHIL NFR BLD AUTO: 2.9 %
ERYTHROCYTE [DISTWIDTH] IN BLOOD BY AUTOMATED COUNT: 11.9 %
GFR SERPLBLD BASED ON 1.73 SQ M-ARVRAT: 100 ML/MIN/1.73M2 (ref 60–?)
GLOBULIN PLAS-MCNC: 2.9 G/DL (ref 2.8–4.4)
GLUCOSE BLD-MCNC: 90 MG/DL (ref 70–99)
HCT VFR BLD AUTO: 42.8 %
HGB BLD-MCNC: 15.1 G/DL
IMM GRANULOCYTES # BLD AUTO: 0.02 X10(3) UL (ref 0–1)
IMM GRANULOCYTES NFR BLD: 0.3 %
LYMPHOCYTES # BLD AUTO: 1.17 X10(3) UL (ref 1–4)
LYMPHOCYTES NFR BLD AUTO: 20 %
MCH RBC QN AUTO: 30.7 PG (ref 26–34)
MCHC RBC AUTO-ENTMCNC: 35.3 G/DL (ref 31–37)
MCV RBC AUTO: 87 FL
MONOCYTES # BLD AUTO: 0.41 X10(3) UL (ref 0.1–1)
MONOCYTES NFR BLD AUTO: 7 %
NEUTROPHILS # BLD AUTO: 4.03 X10 (3) UL (ref 1.5–7.7)
NEUTROPHILS # BLD AUTO: 4.03 X10(3) UL (ref 1.5–7.7)
NEUTROPHILS NFR BLD AUTO: 69.1 %
OSMOLALITY SERPL CALC.SUM OF ELEC: 289 MOSM/KG (ref 275–295)
PLATELET # BLD AUTO: 226 10(3)UL (ref 150–450)
POTASSIUM SERPL-SCNC: 4 MMOL/L (ref 3.5–5.1)
PROT SERPL-MCNC: 6.7 G/DL (ref 6.4–8.2)
RBC # BLD AUTO: 4.92 X10(6)UL
SODIUM SERPL-SCNC: 139 MMOL/L (ref 136–145)
WBC # BLD AUTO: 5.8 X10(3) UL (ref 4–11)

## 2022-09-09 PROCEDURE — 96374 THER/PROPH/DIAG INJ IV PUSH: CPT

## 2022-09-09 PROCEDURE — 96361 HYDRATE IV INFUSION ADD-ON: CPT

## 2022-09-09 PROCEDURE — 85025 COMPLETE CBC W/AUTO DIFF WBC: CPT | Performed by: EMERGENCY MEDICINE

## 2022-09-09 PROCEDURE — 99284 EMERGENCY DEPT VISIT MOD MDM: CPT

## 2022-09-09 PROCEDURE — 80053 COMPREHEN METABOLIC PANEL: CPT | Performed by: EMERGENCY MEDICINE

## 2022-09-09 PROCEDURE — 96375 TX/PRO/DX INJ NEW DRUG ADDON: CPT

## 2022-09-09 PROCEDURE — 72110 X-RAY EXAM L-2 SPINE 4/>VWS: CPT | Performed by: EMERGENCY MEDICINE

## 2022-09-09 RX ORDER — CYCLOBENZAPRINE HCL 10 MG
10 TABLET ORAL 3 TIMES DAILY PRN
Qty: 20 TABLET | Refills: 0 | Status: SHIPPED | OUTPATIENT
Start: 2022-09-09 | End: 2022-09-16

## 2022-09-09 RX ORDER — ONDANSETRON 2 MG/ML
4 INJECTION INTRAMUSCULAR; INTRAVENOUS ONCE
Status: COMPLETED | OUTPATIENT
Start: 2022-09-09 | End: 2022-09-09

## 2022-09-09 RX ORDER — POLYETHYLENE GLYCOL 3350 17 G/17G
17 POWDER, FOR SOLUTION ORAL DAILY PRN
Qty: 12 EACH | Refills: 0 | Status: SHIPPED | OUTPATIENT
Start: 2022-09-09 | End: 2022-10-09

## 2022-09-09 RX ORDER — HYDROMORPHONE HYDROCHLORIDE 1 MG/ML
0.5 INJECTION, SOLUTION INTRAMUSCULAR; INTRAVENOUS; SUBCUTANEOUS EVERY 30 MIN PRN
Status: DISCONTINUED | OUTPATIENT
Start: 2022-09-09 | End: 2022-09-09

## 2022-09-09 RX ORDER — HYDROCODONE BITARTRATE AND ACETAMINOPHEN 5; 325 MG/1; MG/1
1 TABLET ORAL ONCE
Status: COMPLETED | OUTPATIENT
Start: 2022-09-09 | End: 2022-09-09

## 2022-09-09 RX ORDER — HYDROCODONE BITARTRATE AND ACETAMINOPHEN 5; 325 MG/1; MG/1
1-2 TABLET ORAL EVERY 4 HOURS PRN
Qty: 14 TABLET | Refills: 0 | Status: SHIPPED | OUTPATIENT
Start: 2022-09-09

## 2022-09-09 RX ORDER — PREDNISONE 20 MG/1
60 TABLET ORAL DAILY
Qty: 15 TABLET | Refills: 0 | Status: SHIPPED | OUTPATIENT
Start: 2022-09-09 | End: 2022-09-14

## 2022-09-09 RX ORDER — DEXAMETHASONE SODIUM PHOSPHATE 4 MG/ML
6 VIAL (ML) INJECTION ONCE
Status: COMPLETED | OUTPATIENT
Start: 2022-09-09 | End: 2022-09-09

## 2022-09-09 RX ORDER — KETOROLAC TROMETHAMINE 15 MG/ML
15 INJECTION, SOLUTION INTRAMUSCULAR; INTRAVENOUS ONCE
Status: COMPLETED | OUTPATIENT
Start: 2022-09-09 | End: 2022-09-09

## 2022-09-09 NOTE — ED QUICK NOTES
Pt awake and alert, skin w/d,resps reg/unlabored. Pt appears comfortable but is unsure if pain is improved. Pt states pain is mainly with movement. Pt aware we will need to ambulate shortly.      Report given to [facility]

## 2022-09-09 NOTE — ED QUICK NOTES
Attempted to ambulate Patient. Patient was able to stand and take a few steps but pain returned. Patient made it back to bed with assistance. At first standing pain was 2/3. First few steps paitnet sts felt weak. After a few steps patient pain was 5.  RN and MD notified.

## 2022-09-09 NOTE — TELEPHONE ENCOUNTER
Brought by ambulance to THE [facility] ED - currently waiting for attending. fentanyl given by paramedics    [phone] -     Pts spouse updating on condition.

## 2022-09-09 NOTE — ED INITIAL ASSESSMENT (HPI)
Pt presents to the ED via EMS with complaints of back pain. Pt states he has a hx of DDD but not normally take anything for pain.today pt states he bent down and felt pain to left side of back. Pt states he was able to walk to the bedroom and wife called EMS. Pt denies numbness/tingling. Pt awake and alert, skin w/d,resps reg/unlabored. Pt able to move legs easily on cart.

## 2022-09-12 ENCOUNTER — OFFICE VISIT (OUTPATIENT)
Dept: FAMILY MEDICINE CLINIC | Facility: CLINIC | Age: 62
End: 2022-09-12
Payer: COMMERCIAL

## 2022-09-12 VITALS
TEMPERATURE: 97 F | HEIGHT: 72 IN | HEART RATE: 80 BPM | BODY MASS INDEX: 27.5 KG/M2 | RESPIRATION RATE: 16 BRPM | WEIGHT: 203 LBS | DIASTOLIC BLOOD PRESSURE: 70 MMHG | SYSTOLIC BLOOD PRESSURE: 120 MMHG

## 2022-09-12 DIAGNOSIS — M54.50 ACUTE BILATERAL LOW BACK PAIN WITHOUT SCIATICA: Primary | ICD-10-CM

## 2022-09-12 PROCEDURE — 3078F DIAST BP <80 MM HG: CPT | Performed by: FAMILY MEDICINE

## 2022-09-12 PROCEDURE — 3074F SYST BP LT 130 MM HG: CPT | Performed by: FAMILY MEDICINE

## 2022-09-12 PROCEDURE — 3008F BODY MASS INDEX DOCD: CPT | Performed by: FAMILY MEDICINE

## 2022-09-12 PROCEDURE — 99214 OFFICE O/P EST MOD 30 MIN: CPT | Performed by: FAMILY MEDICINE

## 2022-09-12 RX ORDER — FEXOFENADINE HYDROCHLORIDE AND PSEUDOEPHEDRINE HYDROCHLORIDE 60; 120 MG/1; MG/1
TABLET, FILM COATED, EXTENDED RELEASE ORAL
COMMUNITY
Start: 2022-08-03

## 2022-09-12 RX ORDER — PREDNISONE 20 MG/1
TABLET ORAL
Qty: 9 TABLET | Refills: 0 | Status: SHIPPED | OUTPATIENT
Start: 2022-09-12

## 2022-09-12 NOTE — PATIENT INSTRUCTIONS
Finish course of prednisone as we discussed in the office. Continue muscle relaxant cyclobenzaprine. You can use Norco as needed for stronger pain. You can use naproxen 500 mg 1 tablet twice a day after finishing prednisone if needed. Schedule physical therapy.

## 2022-12-02 DIAGNOSIS — M19.041 OSTEOARTHRITIS OF BOTH HANDS, UNSPECIFIED OSTEOARTHRITIS TYPE: Primary | ICD-10-CM

## 2022-12-02 DIAGNOSIS — M19.042 OSTEOARTHRITIS OF BOTH HANDS, UNSPECIFIED OSTEOARTHRITIS TYPE: Primary | ICD-10-CM

## 2022-12-05 ENCOUNTER — TELEPHONE (OUTPATIENT)
Dept: PHYSICAL THERAPY | Facility: HOSPITAL | Age: 62
End: 2022-12-05

## 2022-12-06 ENCOUNTER — OFFICE VISIT (OUTPATIENT)
Dept: PHYSICAL THERAPY | Facility: HOSPITAL | Age: 62
End: 2022-12-06
Attending: FAMILY MEDICINE
Payer: COMMERCIAL

## 2022-12-06 DIAGNOSIS — M54.50 ACUTE BILATERAL LOW BACK PAIN WITHOUT SCIATICA: ICD-10-CM

## 2022-12-06 PROCEDURE — 97140 MANUAL THERAPY 1/> REGIONS: CPT

## 2022-12-06 PROCEDURE — 97110 THERAPEUTIC EXERCISES: CPT

## 2022-12-06 PROCEDURE — 97161 PT EVAL LOW COMPLEX 20 MIN: CPT

## 2022-12-09 ENCOUNTER — OFFICE VISIT (OUTPATIENT)
Dept: PHYSICAL THERAPY | Facility: HOSPITAL | Age: 62
End: 2022-12-09
Attending: FAMILY MEDICINE
Payer: COMMERCIAL

## 2022-12-09 DIAGNOSIS — M54.50 ACUTE BILATERAL LOW BACK PAIN WITHOUT SCIATICA: ICD-10-CM

## 2022-12-09 PROCEDURE — 97140 MANUAL THERAPY 1/> REGIONS: CPT

## 2022-12-09 PROCEDURE — 97110 THERAPEUTIC EXERCISES: CPT

## 2022-12-09 NOTE — PROGRESS NOTES
Diagnosis: Acute bilateral low back pain without sciatica, neck pain, thoracic pain     Referring Provider: Edgardo De La Rosa  Date of Evaluation:    12/6/22    Precautions:  None Next MD visit:   none scheduled  Date of Surgery: n/a   Insurance Primary/Secondary: [address] [place], [place] [address]O / N/A     # Auth Visits: 8            Subjective: Patient states that he is doing generally well. He feels that he has more mobility and has not had any pain shooting from the R scap to the L since last seen. Pain: <1/10       Objective:   (12/9/22):  Good carryover HEP with min VC/modelling for proper performance       Assessment: Patient with overall good response to manual therapy, no recreation of radiating/shooting pain, improved cervical rotation and SB AROM following, mildly TTP at end range to his report. Updated HEP as listed below to improve cervical and thoracic mobility, primarily extension and rotation. Goals: (To be met in 8 visits)   1. Patient will improve R cervical AROM to 70 deg or better without pain to improve ability to look over shoulders without pain. 2. Patient will improve cervical extension AROM to 55 deg without pain to improve ability to look OH without pain. 3. Patient will improve JO scapular strength to 5/5 throughout to improve ability to lift, push, and pull without pain. 4. Patient will improve flexibility of R scalenes and UT to that of L.   5. Patient will be IND and compliant in HEP to maintain progress made in PT. Plan: Continue PT with progression as indicated. Patient leaving to return to [facility] after next session. Date: 12/9/2022  TX#: 2/8 Date:                 TX#: 3/ Date:                 TX#: 4/ Date:                 TX#: 5/ Date:    Tx#: 6/   Manual  - STM R SOs, scalenes, UT, LS  - cervical PROM cervical rotation and SB, focus on R  - cervical side glides, global and focus on mid-cervical gr III  - bouts of cervical manual traction  - C-PA and R U-PA mobs gr III C1-2, C5-7  - C-PA mobs gr III T1-6       Therex  - thoracic threading at wall x 8 R/L   - thoracic ext over foam roll, multiple levels, with and without rotation   - seated UT/LS stretch, multiple bouts JO       -       -       HEP:  cervical rotation with towel, thoracic extension over 1/2 foam roll, thoracic threading rotation, UT stretch     Charges: Manual x 2, Therex x 1       Total Timed Treatment: 45 min  Total Treatment Time: 45 min

## 2022-12-12 ENCOUNTER — OFFICE VISIT (OUTPATIENT)
Dept: PHYSICAL THERAPY | Facility: HOSPITAL | Age: 62
End: 2022-12-12
Attending: FAMILY MEDICINE
Payer: COMMERCIAL

## 2022-12-12 DIAGNOSIS — M54.50 ACUTE BILATERAL LOW BACK PAIN WITHOUT SCIATICA: ICD-10-CM

## 2022-12-12 PROCEDURE — 97140 MANUAL THERAPY 1/> REGIONS: CPT

## 2022-12-12 PROCEDURE — 97110 THERAPEUTIC EXERCISES: CPT

## 2022-12-13 ENCOUNTER — HOSPITAL ENCOUNTER (OUTPATIENT)
Age: 62
Discharge: HOME | End: 2022-12-13
Payer: COMMERCIAL

## 2022-12-13 VITALS
OXYGEN SATURATION: 96 % | WEIGHT: 195 LBS | SYSTOLIC BLOOD PRESSURE: 109 MMHG | DIASTOLIC BLOOD PRESSURE: 73 MMHG | HEART RATE: 100 BPM | BODY MASS INDEX: 26.41 KG/M2 | RESPIRATION RATE: 20 BRPM | HEIGHT: 72 IN | TEMPERATURE: 98 F

## 2022-12-13 DIAGNOSIS — U07.1 COVID-19: Primary | ICD-10-CM

## 2022-12-13 LAB
POCT INFLUENZA A: NEGATIVE
POCT INFLUENZA B: NEGATIVE
SARS-COV-2 RNA RESP QL NAA+PROBE: DETECTED

## 2022-12-13 PROCEDURE — 99203 OFFICE O/P NEW LOW 30 MIN: CPT | Performed by: PHYSICIAN ASSISTANT

## 2022-12-13 PROCEDURE — 87502 INFLUENZA DNA AMP PROBE: CPT | Performed by: PHYSICIAN ASSISTANT

## 2022-12-13 PROCEDURE — U0002 COVID-19 LAB TEST NON-CDC: HCPCS | Performed by: PHYSICIAN ASSISTANT

## 2022-12-13 NOTE — DISCHARGE INSTRUCTIONS
Continue quarantine through tomorrow. May have to extend quarantine if you continue to have a fever.     Go to the ER if you feel chest pain or shortness of breath

## 2022-12-15 ENCOUNTER — TELEPHONE (OUTPATIENT)
Dept: FAMILY MEDICINE CLINIC | Facility: CLINIC | Age: 62
End: 2022-12-15

## 2022-12-15 ENCOUNTER — APPOINTMENT (OUTPATIENT)
Dept: PHYSICAL THERAPY | Facility: HOSPITAL | Age: 62
End: 2022-12-15
Attending: FAMILY MEDICINE
Payer: COMMERCIAL

## 2022-12-15 RX ORDER — BENZONATATE 200 MG/1
200 CAPSULE ORAL 3 TIMES DAILY PRN
Qty: 20 CAPSULE | Refills: 0 | Status: SHIPPED | OUTPATIENT
Start: 2022-12-15

## 2022-12-15 NOTE — TELEPHONE ENCOUNTER
T.C. to pt. Rx sent to the [facility] pharmacy for Tessalon Pearles 200 mg 1 po tid # 20. Pt verbalized understanding.

## 2022-12-15 NOTE — TELEPHONE ENCOUNTER
Pt is calling because he was tested positive for COVID on Tuesday at [facility]. Pt is experiencing a bad dry cough and is looking for medication to feel better and sleep better.     Symptoms    Cough  Runny nose    Fever almost gone  No headache or dizziness    Please advise

## 2022-12-20 ENCOUNTER — APPOINTMENT (OUTPATIENT)
Dept: PHYSICAL THERAPY | Facility: HOSPITAL | Age: 62
End: 2022-12-20
Attending: FAMILY MEDICINE
Payer: COMMERCIAL

## 2023-05-09 ENCOUNTER — TELEPHONE (OUTPATIENT)
Dept: FAMILY MEDICINE CLINIC | Facility: CLINIC | Age: 63
End: 2023-05-09

## 2023-05-09 DIAGNOSIS — E55.9 VITAMIN D DEFICIENCY: ICD-10-CM

## 2023-05-09 DIAGNOSIS — Z00.00 LABORATORY EXAMINATION ORDERED AS PART OF A ROUTINE GENERAL MEDICAL EXAMINATION: Primary | ICD-10-CM

## 2023-05-09 NOTE — TELEPHONE ENCOUNTER
Pt called and schedule physical for 8/8. Pt requesting orders for annual lab work to be placed so he may complete prior. Please place orders if appropriate, thank you!

## 2023-05-09 NOTE — TELEPHONE ENCOUNTER
See below. Labs for px pended for after July 8 as last PSA was then. Please review/sign if you agree. Did you want a vit d and a1c? I did not pend those. Thanks.

## 2023-05-10 NOTE — TELEPHONE ENCOUNTER
I have added vitamin D to pended blood work. Order signed.   I will like patient to get a blood work done 1 week prior his physical.  Thank you

## 2023-05-21 ENCOUNTER — ORDER TRANSCRIPTION (OUTPATIENT)
Dept: ADMINISTRATIVE | Facility: HOSPITAL | Age: 63
End: 2023-05-21

## 2023-05-21 DIAGNOSIS — Z13.6 SCREENING FOR CARDIOVASCULAR CONDITION: Primary | ICD-10-CM

## 2023-05-22 ENCOUNTER — HOSPITAL ENCOUNTER (OUTPATIENT)
Dept: CT IMAGING | Facility: HOSPITAL | Age: 63
End: 2023-05-22
Attending: FAMILY MEDICINE

## 2023-05-22 DIAGNOSIS — Z13.6 SCREENING FOR CARDIOVASCULAR CONDITION: ICD-10-CM

## 2023-05-22 NOTE — PROGRESS NOTES
Date of Service 5/22/2023    Tristan Castillo  Date of Birth 4/11/1960    Patient Age: 58year old    PCP: Zoraida Santiago MD  [address]    Consult Type  Type Scan/Screening: Heart Scan  Preliminary Heart Scan Score: 0 (5/5/2014  Heart Scan Calcium Score 0.0)                Body Mass Index  There is no height or weight on file to calculate BMI. Lipid Profile  Cholesterol: 190, done on 7/8/2022. HDL Cholesterol: 48, done on 7/8/2022. LDL Cholesterol: 118, done on 7/8/2022. TriGlycerides 134, done on 7/8/2022. Nurse Review  Risk factor information and results reviewed with Nurse: Yes    Recommended Follow Up:  Consult your physician regarding[de-identified] Final Heart Scan Report; Discuss potential for Incidental Finding    No data recorded      Recommendations for Change:  Nutrition Changes: Low Saturated Fat  Cholesterol Modification (goal of therapy depends upon your risk): Decrease LDL (Lousy/Bad) Ideal <100  Exercise: Enhance Current Program  Smoking Cessation: No Change Needed  Weight Management: Maintain Current Weight  Stress Management: Adopt Stress Management Techniques  Repeat Heart Scan: 5 years if Calcium Score is 0. 0; Discuss with your Physician          Simran Recommended Resources:  Recommended Resources: Upcoming Classes, Medical Services and Health Library [email or web address]. Mina Jameson RN        Please Contact the Nurse Heart Line with any Questions or Concerns [phone].

## 2023-06-04 ENCOUNTER — PATIENT MESSAGE (OUTPATIENT)
Dept: FAMILY MEDICINE CLINIC | Facility: CLINIC | Age: 63
End: 2023-06-04

## 2023-06-05 ENCOUNTER — TELEPHONE (OUTPATIENT)
Dept: FAMILY MEDICINE CLINIC | Facility: CLINIC | Age: 63
End: 2023-06-05

## 2023-06-05 DIAGNOSIS — Z88.9 MULTIPLE ALLERGIES: Primary | ICD-10-CM

## 2023-06-05 NOTE — TELEPHONE ENCOUNTER
From: Bri Boogie  To: Vicci Dance, MD  Sent: 6/4/2023 5:58 PM CDT  Subject: Allergy Season    Good day, I have been having lots of allergy issues (like everyone else) this past week. I am scheduled to see Dr. Tatyana Saunders in August. Would it be possible to get a referral to [facility] as I see them annually? Thank you, no real rush, I want to review medications with them as well.

## 2023-07-12 ENCOUNTER — LAB ENCOUNTER (OUTPATIENT)
Dept: LAB | Age: 63
End: 2023-07-12
Attending: FAMILY MEDICINE
Payer: COMMERCIAL

## 2023-07-12 DIAGNOSIS — Z00.00 LABORATORY EXAMINATION ORDERED AS PART OF A ROUTINE GENERAL MEDICAL EXAMINATION: ICD-10-CM

## 2023-07-12 DIAGNOSIS — E55.9 VITAMIN D DEFICIENCY: ICD-10-CM

## 2023-07-12 LAB
ALBUMIN SERPL-MCNC: 4 G/DL (ref 3.4–5)
ALBUMIN/GLOB SERPL: 1.4 {RATIO} (ref 1–2)
ALP LIVER SERPL-CCNC: 77 U/L
ALT SERPL-CCNC: 39 U/L
ANION GAP SERPL CALC-SCNC: 3 MMOL/L (ref 0–18)
AST SERPL-CCNC: 26 U/L (ref 15–37)
BASOPHILS # BLD AUTO: 0.06 X10(3) UL (ref 0–0.2)
BASOPHILS NFR BLD AUTO: 1.3 %
BILIRUB SERPL-MCNC: 1.1 MG/DL (ref 0.1–2)
BILIRUB UR QL STRIP.AUTO: NEGATIVE
BUN BLD-MCNC: 13 MG/DL (ref 7–18)
CALCIUM BLD-MCNC: 8.8 MG/DL (ref 8.5–10.1)
CHLORIDE SERPL-SCNC: 109 MMOL/L (ref 98–112)
CHOLEST SERPL-MCNC: 202 MG/DL (ref ?–200)
CLARITY UR REFRACT.AUTO: CLEAR
CO2 SERPL-SCNC: 26 MMOL/L (ref 21–32)
COLOR UR AUTO: YELLOW
COMPLEXED PSA SERPL-MCNC: 0.84 NG/ML (ref ?–4)
CREAT BLD-MCNC: 0.84 MG/DL
EOSINOPHIL # BLD AUTO: 0.36 X10(3) UL (ref 0–0.7)
EOSINOPHIL NFR BLD AUTO: 8.1 %
ERYTHROCYTE [DISTWIDTH] IN BLOOD BY AUTOMATED COUNT: 12 %
FASTING PATIENT LIPID ANSWER: YES
FASTING STATUS PATIENT QL REPORTED: YES
GFR SERPLBLD BASED ON 1.73 SQ M-ARVRAT: 97 ML/MIN/1.73M2 (ref 60–?)
GLOBULIN PLAS-MCNC: 2.8 G/DL (ref 2.8–4.4)
GLUCOSE BLD-MCNC: 94 MG/DL (ref 70–99)
GLUCOSE UR STRIP.AUTO-MCNC: NEGATIVE MG/DL
HCT VFR BLD AUTO: 42.8 %
HDLC SERPL-MCNC: 52 MG/DL (ref 40–59)
HGB BLD-MCNC: 14.9 G/DL
IMM GRANULOCYTES # BLD AUTO: 0.01 X10(3) UL (ref 0–1)
IMM GRANULOCYTES NFR BLD: 0.2 %
KETONES UR STRIP.AUTO-MCNC: NEGATIVE MG/DL
LDLC SERPL CALC-MCNC: 137 MG/DL (ref ?–100)
LEUKOCYTE ESTERASE UR QL STRIP.AUTO: NEGATIVE
LYMPHOCYTES # BLD AUTO: 1.57 X10(3) UL (ref 1–4)
LYMPHOCYTES NFR BLD AUTO: 35.2 %
MCH RBC QN AUTO: 30.4 PG (ref 26–34)
MCHC RBC AUTO-ENTMCNC: 34.8 G/DL (ref 31–37)
MCV RBC AUTO: 87.3 FL
MONOCYTES # BLD AUTO: 0.4 X10(3) UL (ref 0.1–1)
MONOCYTES NFR BLD AUTO: 9 %
NEUTROPHILS # BLD AUTO: 2.06 X10 (3) UL (ref 1.5–7.7)
NEUTROPHILS # BLD AUTO: 2.06 X10(3) UL (ref 1.5–7.7)
NEUTROPHILS NFR BLD AUTO: 46.2 %
NITRITE UR QL STRIP.AUTO: NEGATIVE
NONHDLC SERPL-MCNC: 150 MG/DL (ref ?–130)
OSMOLALITY SERPL CALC.SUM OF ELEC: 286 MOSM/KG (ref 275–295)
PH UR STRIP.AUTO: 6 [PH] (ref 5–8)
PLATELET # BLD AUTO: 229 10(3)UL (ref 150–450)
POTASSIUM SERPL-SCNC: 4.3 MMOL/L (ref 3.5–5.1)
PROT SERPL-MCNC: 6.8 G/DL (ref 6.4–8.2)
PROT UR STRIP.AUTO-MCNC: NEGATIVE MG/DL
RBC # BLD AUTO: 4.9 X10(6)UL
RBC UR QL AUTO: NEGATIVE
SODIUM SERPL-SCNC: 138 MMOL/L (ref 136–145)
SP GR UR STRIP.AUTO: 1.01 (ref 1–1.03)
TRIGL SERPL-MCNC: 74 MG/DL (ref 30–149)
TSI SER-ACNC: 1.11 MIU/ML (ref 0.36–3.74)
UROBILINOGEN UR STRIP.AUTO-MCNC: <2 MG/DL
VIT D+METAB SERPL-MCNC: 50.5 NG/ML (ref 30–100)
VLDLC SERPL CALC-MCNC: 13 MG/DL (ref 0–30)
WBC # BLD AUTO: 4.5 X10(3) UL (ref 4–11)

## 2023-07-12 PROCEDURE — 36415 COLL VENOUS BLD VENIPUNCTURE: CPT

## 2023-07-12 PROCEDURE — 81003 URINALYSIS AUTO W/O SCOPE: CPT

## 2023-07-12 PROCEDURE — 85025 COMPLETE CBC W/AUTO DIFF WBC: CPT

## 2023-07-12 PROCEDURE — 82306 VITAMIN D 25 HYDROXY: CPT

## 2023-07-12 PROCEDURE — 80061 LIPID PANEL: CPT

## 2023-07-12 PROCEDURE — 80053 COMPREHEN METABOLIC PANEL: CPT

## 2023-07-12 PROCEDURE — 84443 ASSAY THYROID STIM HORMONE: CPT

## 2023-07-20 ENCOUNTER — OFFICE VISIT (OUTPATIENT)
Dept: ORTHOPEDICS CLINIC | Facility: CLINIC | Age: 63
End: 2023-07-20
Payer: COMMERCIAL

## 2023-07-20 VITALS — WEIGHT: 195 LBS | BODY MASS INDEX: 26.41 KG/M2 | HEIGHT: 72 IN

## 2023-07-20 DIAGNOSIS — M17.11 PRIMARY OSTEOARTHRITIS OF RIGHT KNEE: Primary | ICD-10-CM

## 2023-07-20 PROCEDURE — 20600 DRAIN/INJ JOINT/BURSA W/O US: CPT | Performed by: PHYSICIAN ASSISTANT

## 2023-07-20 PROCEDURE — 3008F BODY MASS INDEX DOCD: CPT | Performed by: PHYSICIAN ASSISTANT

## 2023-07-20 PROCEDURE — 99213 OFFICE O/P EST LOW 20 MIN: CPT | Performed by: PHYSICIAN ASSISTANT

## 2023-07-20 RX ORDER — BETAMETHASONE SODIUM PHOSPHATE AND BETAMETHASONE ACETATE 3; 3 MG/ML; MG/ML
6 INJECTION, SUSPENSION INTRA-ARTICULAR; INTRALESIONAL; INTRAMUSCULAR; SOFT TISSUE ONCE
Status: COMPLETED | OUTPATIENT
Start: 2023-07-20 | End: 2023-07-20

## 2023-07-20 RX ADMIN — BETAMETHASONE SODIUM PHOSPHATE AND BETAMETHASONE ACETATE 6 MG: 3; 3 INJECTION, SUSPENSION INTRA-ARTICULAR; INTRALESIONAL; INTRAMUSCULAR; SOFT TISSUE at 10:55:00

## 2023-08-08 ENCOUNTER — OFFICE VISIT (OUTPATIENT)
Dept: FAMILY MEDICINE CLINIC | Facility: CLINIC | Age: 63
End: 2023-08-08
Payer: COMMERCIAL

## 2023-08-08 ENCOUNTER — PATIENT MESSAGE (OUTPATIENT)
Dept: FAMILY MEDICINE CLINIC | Facility: CLINIC | Age: 63
End: 2023-08-08

## 2023-08-08 VITALS
TEMPERATURE: 98 F | SYSTOLIC BLOOD PRESSURE: 116 MMHG | RESPIRATION RATE: 16 BRPM | HEIGHT: 72 IN | HEART RATE: 80 BPM | DIASTOLIC BLOOD PRESSURE: 78 MMHG | WEIGHT: 203 LBS | BODY MASS INDEX: 27.5 KG/M2

## 2023-08-08 DIAGNOSIS — R41.3 MEMORY PROBLEM: ICD-10-CM

## 2023-08-08 DIAGNOSIS — E78.00 PURE HYPERCHOLESTEROLEMIA: ICD-10-CM

## 2023-08-08 DIAGNOSIS — Z00.00 PHYSICAL EXAM, ANNUAL: Primary | ICD-10-CM

## 2023-08-08 DIAGNOSIS — Z88.9 MULTIPLE ALLERGIES: ICD-10-CM

## 2023-08-08 PROCEDURE — 3008F BODY MASS INDEX DOCD: CPT | Performed by: FAMILY MEDICINE

## 2023-08-08 PROCEDURE — 3078F DIAST BP <80 MM HG: CPT | Performed by: FAMILY MEDICINE

## 2023-08-08 PROCEDURE — 99213 OFFICE O/P EST LOW 20 MIN: CPT | Performed by: FAMILY MEDICINE

## 2023-08-08 PROCEDURE — 99396 PREV VISIT EST AGE 40-64: CPT | Performed by: FAMILY MEDICINE

## 2023-08-08 PROCEDURE — 3074F SYST BP LT 130 MM HG: CPT | Performed by: FAMILY MEDICINE

## 2023-08-08 RX ORDER — AZELASTINE HYDROCHLORIDE, FLUTICASONE PROPIONATE 137; 50 UG/1; UG/1
SPRAY, METERED NASAL
COMMUNITY
Start: 2023-06-16

## 2023-08-08 NOTE — PATIENT INSTRUCTIONS
Healthy diet. Watch diet for fats. Stay active. Try magnesium 200 mg daily over-the-counter. Continue vitamin B complex. See neurologist for evaluation.

## 2023-08-08 NOTE — TELEPHONE ENCOUNTER
From: Lamberto Tan  To: Ricky De La Cruz MD  Sent: 8/8/2023 12:20 PM CDT  Subject: Neurologist     Good day, you provided me with information regarding [facility] testing. However upon examining my paper work, there is no Doctor listed or any referral. I think we are both having issues.     Thanks    [employer]

## 2023-09-12 ENCOUNTER — PATIENT MESSAGE (OUTPATIENT)
Dept: FAMILY MEDICINE CLINIC | Facility: CLINIC | Age: 63
End: 2023-09-12

## 2023-09-12 DIAGNOSIS — M79.672 PAIN IN BOTH FEET: Primary | ICD-10-CM

## 2023-09-12 DIAGNOSIS — M79.671 PAIN IN BOTH FEET: Primary | ICD-10-CM

## 2023-12-05 ENCOUNTER — OFFICE VISIT (OUTPATIENT)
Dept: PODIATRY CLINIC | Facility: CLINIC | Age: 63
End: 2023-12-05
Payer: COMMERCIAL

## 2023-12-05 DIAGNOSIS — M79.672 LEFT FOOT PAIN: ICD-10-CM

## 2023-12-05 DIAGNOSIS — G57.62 NEUROMA OF SECOND INTERSPACE OF LEFT FOOT: ICD-10-CM

## 2023-12-05 DIAGNOSIS — M72.2 PLANTAR FASCIITIS OF RIGHT FOOT: ICD-10-CM

## 2023-12-05 DIAGNOSIS — M72.2 PLANTAR FASCIITIS OF LEFT FOOT: Primary | ICD-10-CM

## 2023-12-05 PROCEDURE — 99203 OFFICE O/P NEW LOW 30 MIN: CPT | Performed by: STUDENT IN AN ORGANIZED HEALTH CARE EDUCATION/TRAINING PROGRAM

## 2023-12-06 ENCOUNTER — OFFICE VISIT (OUTPATIENT)
Dept: NEUROLOGY | Facility: CLINIC | Age: 63
End: 2023-12-06
Payer: COMMERCIAL

## 2023-12-06 VITALS
WEIGHT: 203.69 LBS | RESPIRATION RATE: 16 BRPM | HEART RATE: 81 BPM | BODY MASS INDEX: 28 KG/M2 | DIASTOLIC BLOOD PRESSURE: 67 MMHG | SYSTOLIC BLOOD PRESSURE: 122 MMHG

## 2023-12-06 DIAGNOSIS — R41.3 MEMORY LOSS: Primary | ICD-10-CM

## 2023-12-06 PROCEDURE — 99204 OFFICE O/P NEW MOD 45 MIN: CPT | Performed by: OTHER

## 2023-12-06 PROCEDURE — 3078F DIAST BP <80 MM HG: CPT | Performed by: OTHER

## 2023-12-06 PROCEDURE — 3074F SYST BP LT 130 MM HG: CPT | Performed by: OTHER

## 2023-12-06 NOTE — PROGRESS NOTES
Pt reports he began noticing changes to memory in the last 1-2 years ago. Pt states he has 'things going on\" - and then he forgets. Pt reports he has noticed biggest issue with focus.      Pt denies leaving stove on or getting lost driving - states he might forget where he was going to go, but does not get lost.

## 2023-12-06 NOTE — H&P
Neurology H&P    Jose L Irvin Patient Status:  No patient class for patient encounter    1960 MRN [id number]   [facility], [address], [facility] Attending No att. providers found   Hosp Day # 0 PCP Justice Rodriguez MD     Subjective: Jose L Irvin is a(n) 58year old male who comes to the neurology clinic for evaluation of memory loss. He states that for a couple of years he has had difficulty focusing,. He states for instance that he can write a reminder for himself to do something and then forget about the task until his timer for the event goes off. He states that he has little interest in doing things that he used to find interesting. He has been under more stress due to his wife's medical condition for the past couple of years. He states that he can get into his car to go somewhere and forget where he was going. He reports that he has no difficulty navigating and no unexplained accidents or damage to the car. He states that he gets into the car and then immediately starts thinking of something else and doesn't concentrate on what he is doing. He had a grandmother and aunt with Alzheimer's. He reports that his hearing is very good. His wife manages the household finances and always has. He manages al of his own ADLs. He is not forgetting important dates or holidays and not forgetting family members names etc.     Current Medications:  Current Outpatient Medications   Medication Sig Dispense Refill    Azelastine-Fluticasone 137-50 MCG/ACT Nasal Suspension       ALLEGRA-D ALLERGY & CONGESTION  MG Oral Tablet 12 Hr       aspirin 81 MG Oral Tab EC Take 1 tablet (81 mg total) by mouth daily. Sildenafil Citrate 50 MG Oral Tab Take 1 tablet (50 mg total) by mouth daily as needed for Erectile Dysfunction. 30 tablet 1    Cholecalciferol (VITAMIN D3) 1000 units Oral Cap Take 1 tablet by mouth daily.       Cyanocobalamin (VITAMIN B 12 OR) Take 1 capsule by mouth daily.      Omega-3 Fatty Acids (FISH OIL OR) by Does not apply route. Multiple Vitamin (MULTIVITAMIN OR) Take  by mouth. Problem List:  Patient Active Problem List   Diagnosis    Pure hypercholesterolemia    Pure hyperglyceridemia    Vitamin D deficiency    Multiple allergies    Degeneration of lumbar or lumbosacral intervertebral disc    Fibroepithelial polyp    Diverticulitis, colon    Family history of coronary artery disease in father    Primary osteoarthritis of left knee               Global exp 10-25-17 / LEFT KNEE / TRK     Acute medial meniscus tear of left knee, initial encounter    Acute medial meniscus tear of left knee            Global exp 10-25-17 / LEFT KNEE / TRK     Acute lateral meniscus tear of left knee               Global exp 10-25-17 / LEFT KNEE / TRK     Erectile dysfunction    History of adenomatous polyp of colon    Polyp of colon       PMHx:  Past Medical History:   Diagnosis Date    Arthritis     Back pain Degenerative Disk Disease    Chest pain on exertion 10 yrs ago    DDD (degenerative disc disease), lumbar     Flatulence/gas pain/belching Occasionally    Hemorrhoids Been treated previously    Night sweats Last 10 years    Other and unspecified hyperlipidemia     Pain in joints Hands / Back    Sinusitis 1/16/2012    Sleep apnea Being tested later on    Sleep disturbance Stop breathing  at night    Vitamin D deficiency        PSHx:  Past Surgical History:   Procedure Laterality Date    COLONOSCOPY  3/2010    complete, polyps    COLONOSCOPY  2010    polyps due in 5 years    COLONOSCOPY  7/2015    due in 5 years dr Jaime Riojas      eye surgery for strabismus       SocHx:  Social History     Socioeconomic History    Marital status:    Tobacco Use    Smoking status: Never    Smokeless tobacco: Never   Vaping Use    Vaping Use: Never used   Substance and Sexual Activity    Alcohol use:  Yes     Alcohol/week: 0.0 standard drinks of alcohol Comment: occ    Drug use: No    Sexual activity: Yes     Partners: Female   Other Topics Concern    Caffeine Concern No     Comment: 1qd    Exercise Yes     Comment: tries a few x a week    Seat Belt Yes       Family History:  Family History   Problem Relation Age of Onset    Heart Attack Father         AMI    Heart Disease Father         valve replacement    Heart Disorder Father         MI at 42/stent coronary    Hypertension Father     Lipids Father     Other (Other) Father         arthritis    Other (Other) Mother         bowel rupture/arthritis    Heart Disorder Paternal Grandmother     Heart Disorder Paternal Grandfather         ROS:  10 point ROS completed and was negative, except for pertinent positive and negatives stated in subjective. Objective/Physical Exam:    Vital Signs: There were no vitals taken for this visit. Gen: Awake and in no apparent distress  HEENT: moist mucus membranes  Neck: Supple  Cardiovascular: Regular rate and rhythm, no murmur  Pulm: CTAB  GI: non-tender, normal bowel sounds  Skin: normal, dry  Extremities: No clubbing or cyanosis      Neurologic:   MENTAL STATUS:   MOCA: 12/6/23  Visuospatial/executive 5/5  Naming 3/3  Attention 5/6  Language 3/3  Abstraction 2/2  Delayed Recall 2/5  Orientation 6/6  Total: 26/30      CRANIAL NERVES II to XII: PERRLA, no ptosis or diplopia, EOM intact, facial sensation intact, strong eye closure, face is symmetric, no dysarthria, tongue midline,  no tongue fasciculations or atrophy, strong shoulder shrug.     MOTOR EXAMINATION: normal tone, no fasciculations, normal strength throughout in UEs and LEs      SENSORY EXAMINATION:  UE: intact to light touch, pinprick intact  LE: intact to light touch, pinprick intact    COORDINATION:  No dysmetria, or intention tremors     REFLEXES: 2+ at biceps, 2+ brachioradialis, 2+ at patella, 2+ at the ankles     GAIT: normal stance, normal toe gait and heel gait        Labs:       Imaging:  No CNS imagine to review    Assessment: This is a 57 y/o male with report of poor concentration for the past couple of years. His MOCA was normal today at 26/30 with points off for serial sevens and word recall. His exam is otherwise normal. I dont feel that his represent dementia at this point. He may have some mild cognitive impairment but I question if stress and anxiety from social stressors (helping to care for wife and elderly father) is playing more of a role in this. I did offer referral for formal cognitive testing and he declines. I offered MRI brain and he declines. I do not think that we need to start a cholinesterase inhibitor at this time. He should maintain an active life style and also work on cognitive strength with sodoku, crossword puzzles etc. He should also contuen to keep social engagements and eat a healthy Mediterranean diet.       Plan:  Memory loss  - MOCA 26/30 12/6/23 was normal   - Declines any CNS imaging  - Declines any referral for formal cognitive testing    RTC in 6 month       Obey Daniels DO  Neurology

## 2023-12-06 NOTE — PROGRESS NOTES
[facility]  Progress Note    Narciso Robles is a 58year old male. Chief Complaint   Patient presents with    Foot Pain     Consult- Bilateral foot- Pt states he has pain in feet to due high arches. Pt  has been using custom orthotics. Some tingling at night. HPI:     Patient is a pleasant 19-year-old male presents to clinic for evaluation of multiple pedal complaints. He has pain to bilateral heels that has been going on for a while. He does get some tingling at night. Patient does have increased medial arch height and has used custom inserts in the past.  He has had custom inserts with Dr. Caryn Rosales in the past that worked well for him. He also has history of neuroma to left second interspace and has had cortisone injections to site. He is without pain but does have some splaying to the second and third toes. He gets a little irritation where the second toe rubs on his big toe. No other complaints are mentioned. Past medical history, medications, and allergies reviewed. Allergies: Pcn [penicillins], Ceftin, Lipitor [atorvastatin calcium], and Sulfa drugs cross reactors   Current Outpatient Medications   Medication Sig Dispense Refill    Azelastine-Fluticasone 137-50 MCG/ACT Nasal Suspension       ALLEGRA-D ALLERGY & CONGESTION  MG Oral Tablet 12 Hr       aspirin 81 MG Oral Tab EC Take 1 tablet (81 mg total) by mouth daily. Sildenafil Citrate 50 MG Oral Tab Take 1 tablet (50 mg total) by mouth daily as needed for Erectile Dysfunction. 30 tablet 1    Cholecalciferol (VITAMIN D3) 1000 units Oral Cap Take 1 tablet by mouth daily. Cyanocobalamin (VITAMIN B 12 OR) Take 1 capsule by mouth daily. Omega-3 Fatty Acids (FISH OIL OR) by Does not apply route. Multiple Vitamin (MULTIVITAMIN OR) Take  by mouth.         Past Medical History:   Diagnosis Date    Arthritis     Back pain Degenerative Disk Disease    Chest pain on exertion 10 yrs ago    DDD (degenerative disc disease), lumbar     Flatulence/gas pain/belching Occasionally    Hemorrhoids Been treated previously    Night sweats Last 10 years    Other and unspecified hyperlipidemia     Pain in joints Hands / Back    Sinusitis 1/16/2012    Sleep apnea Being tested later on    Sleep disturbance Stop breathing  at night    Vitamin D deficiency       Past Surgical History:   Procedure Laterality Date    COLONOSCOPY  3/2010    complete, polyps    COLONOSCOPY  2010    polyps due in 5 years    COLONOSCOPY  7/2015    due in 5 years dr Richa Piedra      eye surgery for strabismus      Family History   Problem Relation Age of Onset    Heart Attack Father         AMI    Heart Disease Father         valve replacement    Heart Disorder Father         MI at 42/stent coronary    Hypertension Father     Lipids Father     Other (Other) Father         arthritis    Other (Other) Mother         bowel rupture/arthritis    Heart Disorder Paternal Grandmother     Heart Disorder Paternal Grandfather       Social History     Socioeconomic History    Marital status:    Tobacco Use    Smoking status: Never    Smokeless tobacco: Never   Vaping Use    Vaping Use: Never used   Substance and Sexual Activity    Alcohol use: Yes     Alcohol/week: 0.0 standard drinks of alcohol     Comment: occ    Drug use: No    Sexual activity: Yes     Partners: Female   Other Topics Concern    Caffeine Concern No     Comment: 1qd    Exercise Yes     Comment: tries a few x a week    Seat Belt Yes           REVIEW OF SYSTEMS:     Today reviewed systems as documented below  GENERAL HEALTH: feels well otherwise  SKIN: denies any unusual skin lesions or rashes  RESPIRATORY: denies shortness of breath with exertion  CARDIOVASCULAR: denies chest pain on exertion  GI: denies abdominal pain and denies heartburn  NEURO: denies headaches  MUSCULO: History of lower back pain      EXAM:   There were no vitals taken for this visit.   GENERAL: well developed, well nourished, in no apparent distress  EXTREMITIES:   1. Integument: Normal skin temperature and turgor  2. Vascular: Dorsalis pedis two out of four bilateral and posterior tibial pulses two out of   four bilateral, capillary refill normal.   3. Musculoskeletal: All muscle groups are graded 5 out of 5 in the foot and ankle. Pain on palpation of the plantar heel bilaterally at medial tubercle of calcaneus. Splaying of second and third toes left foot. Mild rubbing between second toe and hallux of left foot. Mild HAV deformity. Increased medial arch height appreciated. 4. Neurological: Normal sharp dull sensation; reflexes normal.          ASSESSMENT AND PLAN:   Diagnoses and all orders for this visit:    Plantar fasciitis of left foot  -     [facility] AMB POD XR - LT FOOT 2 VIEWS(AP, LATERAL)WT BEARING  -     Cancel: DME - External  -     MISC [facility] PROC FOR MANAGED CARE AUTH    Plantar fasciitis of right foot  -     Cancel: DME - External  -     MISC [facility] PROC FOR MANAGED CARE AUTH    Neuroma of second interspace of left foot    Left foot pain        Plan:    -Patient examined, chart history reviewed. -Discussed etiology of patient's condition and various treatment options.  -Discussed importance of proper shoe gear and orthotics. Patient to avoid walking barefoot at all times.  -Discussed importance of stretching exercises. Patient to aim to stretch regularly.  -Discussed steroid injection with patient including benefits and risks. Patient will think about this and may consider at next visit. -Authorization initiated for custom inserts--these are medically  necessary given pt's unique foot type and pain.  -Patient does have some neuroma symptoms to left foot and rubbing tween hallux and second toe. Toe spacer dispensed the patient can use to try to offload this. Also briefly discussed possible surgical intervention for this as well.   Surgery does not appear necessary at this time.    -Can follow-up for orthotic casting in 3 to 4 weeks once approved. Could also consider cortisone injection at this time. The patient indicates understanding of these issues and agrees to the plan. Sammuel Heimlich, DPM    Dragon speech recognition software was used to prepare this note. Errors in word recognition may occur. Please contact me with any questions/concerns with this note.

## 2023-12-27 RX ORDER — SILDENAFIL 50 MG/1
50 TABLET, FILM COATED ORAL
Qty: 30 TABLET | Refills: 0 | Status: SHIPPED | OUTPATIENT
Start: 2023-12-27

## 2023-12-27 NOTE — TELEPHONE ENCOUNTER
Last office visit: 8/8/2023   Labs last completed: 7/12/2023  Requested medication(s) are due for refill today: yes  Requested medication(s) are on the active medication list same strength, form, dose/ sig: yes  Requested medication(s) are managed by provider: yes  Patient has already received a courtsey refill: no

## 2023-12-29 ENCOUNTER — OFFICE VISIT (OUTPATIENT)
Dept: PODIATRY CLINIC | Facility: CLINIC | Age: 63
End: 2023-12-29
Payer: COMMERCIAL

## 2023-12-29 DIAGNOSIS — M72.2 PLANTAR FASCIITIS OF RIGHT FOOT: ICD-10-CM

## 2023-12-29 DIAGNOSIS — G57.62 NEUROMA OF SECOND INTERSPACE OF LEFT FOOT: ICD-10-CM

## 2023-12-29 DIAGNOSIS — M72.2 PLANTAR FASCIITIS OF LEFT FOOT: Primary | ICD-10-CM

## 2023-12-29 PROCEDURE — 29799 UNLISTED PX CASTING/STRPG: CPT | Performed by: STUDENT IN AN ORGANIZED HEALTH CARE EDUCATION/TRAINING PROGRAM

## 2023-12-29 PROCEDURE — L3000 FT INSERT UCB BERKELEY SHELL: HCPCS | Performed by: STUDENT IN AN ORGANIZED HEALTH CARE EDUCATION/TRAINING PROGRAM

## 2023-12-29 NOTE — PROGRESS NOTES
[facility]  Progress Note    Lynda Morrison is a 58year old male. Chief Complaint   Patient presents with    Follow - Up     Orthotics casting          HPI:     Patient is a pleasant 78-year-old male presents to clinic for casting. He does have plantar fasciitis symptoms to both feet that have responded well to custom orthotics in the past.  He has completed prior authorization. No other complaints are mentioned. Allergies: Pcn [penicillins], Ceftin, Lipitor [atorvastatin calcium], and Sulfa drugs cross reactors   Current Outpatient Medications   Medication Sig Dispense Refill    Sildenafil Citrate 50 MG Oral Tab Take 1 tablet (50 mg total) by mouth daily as needed for Erectile Dysfunction. 30 tablet 0    B Complex-C-Folic Acid Oral Tab Take by mouth. Azelastine-Fluticasone 137-50 MCG/ACT Nasal Suspension       ALLEGRA-D ALLERGY & CONGESTION  MG Oral Tablet 12 Hr       aspirin 81 MG Oral Tab EC Take 1 tablet (81 mg total) by mouth daily. Cholecalciferol (VITAMIN D3) 1000 units Oral Cap Take 1 tablet by mouth daily. Omega-3 Fatty Acids (FISH OIL OR) by Does not apply route. Multiple Vitamin (MULTIVITAMIN OR) Take  by mouth.         Past Medical History:   Diagnosis Date    Arthritis     Back pain Degenerative Disk Disease    Chest pain on exertion 10 yrs ago    DDD (degenerative disc disease), lumbar     Flatulence/gas pain/belching Occasionally    Hemorrhoids Been treated previously    Night sweats Last 10 years    Other and unspecified hyperlipidemia     Pain in joints Hands / Back    Sinusitis 1/16/2012    Sleep apnea Being tested later on    Sleep disturbance Stop breathing  at night    Vitamin D deficiency       Past Surgical History:   Procedure Laterality Date    COLONOSCOPY  3/2010    complete, polyps    COLONOSCOPY  2010    polyps due in 5 years    COLONOSCOPY  7/2015    due in 5 years dr Marylu Schmidt      eye surgery for strabismus Family History   Problem Relation Age of Onset    Heart Attack Father         AMI    Heart Disease Father         valve replacement    Heart Disorder Father         MI at 42/stent coronary    Hypertension Father     Lipids Father     Other (Other) Father         arthritis    Other (Other) Mother         bowel rupture/arthritis    Heart Disorder Paternal Grandmother     Heart Disorder Paternal Grandfather     Heart Disease Paternal Grandfather          from [address] Social History     Socioeconomic History    Marital status:    Tobacco Use    Smoking status: Never    Smokeless tobacco: Never   Vaping Use    Vaping Use: Never used   Substance and Sexual Activity    Alcohol use: Yes     Alcohol/week: 0.0 standard drinks of alcohol     Comment: occ    Drug use: No    Sexual activity: Yes     Partners: Female   Other Topics Concern    Caffeine Concern Yes     Comment: 1qd    Stress Concern No    Weight Concern No    Special Diet No    Exercise Yes    Seat Belt Yes           REVIEW OF SYSTEMS:     Today reviewed systems as documented below  GENERAL HEALTH: feels well otherwise  SKIN: denies any unusual skin lesions or rashes  RESPIRATORY: denies shortness of breath with exertion  CARDIOVASCULAR: denies chest pain on exertion  GI: denies abdominal pain and denies heartburn  NEURO: denies headaches  MUSCULO: History of lower back pain      EXAM:   There were no vitals taken for this visit. GENERAL: well developed, well nourished, in no apparent distress  EXTREMITIES:   1. Integument: Normal skin temperature and turgor  2. Vascular: Dorsalis pedis two out of four bilateral and posterior tibial pulses two out of   four bilateral, capillary refill normal.   3. Musculoskeletal: All muscle groups are graded 5 out of 5 in the foot and ankle. Pain on palpation of the plantar heel bilaterally at medial tubercle of calcaneus. Splaying of second and third toes left foot.   Mild rubbing between second toe and hallux of left foot. Mild HAV deformity. Increased medial arch height appreciated. 4. Neurological: Normal sharp dull sensation; reflexes normal.          ASSESSMENT AND PLAN:   Diagnoses and all orders for this visit:    Plantar fasciitis of left foot    Plantar fasciitis of right foot    Neuroma of second interspace of left foot          Plan:    -Patient examined, chart history reviewed. -Discussed etiology of patient's condition and various treatment options.  -Discussed importance of proper shoe gear and orthotics. Patient to avoid walking barefoot at all times.  -Patient has completed prior authorization for custom inserts and they are medically necessary.  -Patient casted for custom inserts with plaster casting, keeping foot in neutral position. Will call patient orthotics are ready for pickup. Discussed break-in period for orthotics with patient. Questions were answered to satisfaction. The patient indicates understanding of these issues and agrees to the plan. ARASELI Avila speech recognition software was used to prepare this note. Errors in word recognition may occur. Please contact me with any questions/concerns with this note.

## 2024-02-05 ENCOUNTER — OFFICE VISIT (OUTPATIENT)
Dept: FAMILY MEDICINE CLINIC | Facility: CLINIC | Age: 64
End: 2024-02-05
Payer: COMMERCIAL

## 2024-02-05 ENCOUNTER — TELEPHONE (OUTPATIENT)
Dept: FAMILY MEDICINE CLINIC | Facility: CLINIC | Age: 64
End: 2024-02-05

## 2024-02-05 VITALS
TEMPERATURE: 97 F | SYSTOLIC BLOOD PRESSURE: 118 MMHG | BODY MASS INDEX: 27.83 KG/M2 | WEIGHT: 210 LBS | HEART RATE: 85 BPM | DIASTOLIC BLOOD PRESSURE: 60 MMHG | RESPIRATION RATE: 18 BRPM | HEIGHT: 73 IN

## 2024-02-05 DIAGNOSIS — J01.90 ACUTE NON-RECURRENT SINUSITIS, UNSPECIFIED LOCATION: Primary | ICD-10-CM

## 2024-02-05 DIAGNOSIS — R10.2 SUPRAPUBIC PRESSURE: ICD-10-CM

## 2024-02-05 LAB
APPEARANCE: CLEAR
BILIRUBIN: NEGATIVE
GLUCOSE (URINE DIPSTICK): NEGATIVE MG/DL
KETONES (URINE DIPSTICK): NEGATIVE MG/DL
LEUKOCYTES: NEGATIVE
MULTISTIX EXPIRATION DATE: ABNORMAL DATE
MULTISTIX LOT#: ABNORMAL NUMERIC
NITRITE, URINE: NEGATIVE
OCCULT BLOOD: NEGATIVE
PH, URINE: 5.5 (ref 4.5–8)
PROTEIN (URINE DIPSTICK): NEGATIVE MG/DL
SPECIFIC GRAVITY: 1 (ref 1–1.03)
URINE-COLOR: YELLOW
UROBILINOGEN,SEMI-QN: 0.2 MG/DL (ref 0–1.9)

## 2024-02-05 PROCEDURE — 87086 URINE CULTURE/COLONY COUNT: CPT

## 2024-02-05 RX ORDER — LEVOFLOXACIN 500 MG/1
500 TABLET, FILM COATED ORAL DAILY
Qty: 10 TABLET | Refills: 0 | Status: SHIPPED | OUTPATIENT
Start: 2024-02-05 | End: 2024-02-15

## 2024-02-05 NOTE — TELEPHONE ENCOUNTER
Pt requesting apt for possible sinus infection and abd pain.    Symptoms: blood when blowing nose, pressure in eyes /ears, ears feel like they have water in them. Abd pain.    Onset: 3-4 days. Abd pain since Sat 2/3.    Please advise if /when to schedule?

## 2024-02-05 NOTE — PROGRESS NOTES
[facility] Family [facility] Office Note  Chief Complaint:   Chief Complaint   Patient presents with    Abdominal Pain     2 days     Sinus Problem     Sinus infection for a week       HPI:   This is a 63 year old male coming in for multiple concerns.    Reports suprapubic pressure Saturday. Pressure is present with urination. Denies dysuria, hematuria, radiating pain, fevers, nausea, or vomiting. At times he has suprapubic pressure with moving his bowels. Denies any new sexual partners, is not concerned about potential STI at this time.     Reports sinus symptoms since last Wednesday. Is experiencing pain and pressure behind his eyes, nasal congestion, and ear pressure. Blood tinged mucus while blowing nose. Ears feel like they are plugged with water. Has previous history of sinus infections and recalls similar symptoms at that time.     OTC: Mucinex, allegra D, benadryl       Past Medical History:   Diagnosis Date    Arthritis     Back pain Degenerative Disk Disease    Chest pain on exertion 10 yrs ago    DDD (degenerative disc disease), lumbar     Flatulence/gas pain/belching Occasionally    Hemorrhoids Been treated previously    Night sweats Last 10 years    Other and unspecified hyperlipidemia     Pain in joints Hands / Back    Sinusitis 1/16/2012    Sleep apnea Being tested later on    Sleep disturbance Stop breathing  at night    Vitamin D deficiency      Past Surgical History:   Procedure Laterality Date    COLONOSCOPY  3/2010    complete, polyps    COLONOSCOPY  2010    polyps due in 5 years    COLONOSCOPY  7/2015    due in 5 years dr Rodriguez    OTHER SURGICAL HISTORY      eye surgery for strabismus     Social History:  Social History     Socioeconomic History    Marital status:    Tobacco Use    Smoking status: Never    Smokeless tobacco: Never   Vaping Use    Vaping Use: Never used   Substance and Sexual Activity    Alcohol use: Yes     Alcohol/week: 0.0 standard drinks of alcohol      Comment: occ    Drug use: No    Sexual activity: Yes     Partners: Female   Other Topics Concern    Caffeine Concern Yes     Comment: 1qd    Stress Concern No    Weight Concern No    Special Diet No    Exercise Yes    Seat Belt Yes     Family History:  Family History   Problem Relation Age of Onset    Heart Attack Father         AMI    Heart Disease Father         valve replacement    Heart Disorder Father         MI at 42/stent coronary    Hypertension Father     Lipids Father     Other (Other) Father         arthritis    Other (Other) Mother         bowel rupture/arthritis    Heart Disorder Paternal Grandmother     Heart Disorder Paternal Grandfather     Heart Disease Paternal Grandfather          from [place].     Allergies:  Allergies   Allergen Reactions    Pcn [Penicillins] HIVES    Ceftin RASH     TABS    Lipitor [Atorvastatin Calcium]      TABS: Aches    Sulfa Drugs Cross Reactors      Fever/Flu-like symptoms     Current Meds:  Current Outpatient Medications   Medication Sig Dispense Refill    levoFLOXacin 500 MG Oral Tab Take 1 tablet (500 mg total) by mouth daily for 10 days. 10 tablet 0    Sildenafil Citrate 50 MG Oral Tab Take 1 tablet (50 mg total) by mouth daily as needed for Erectile Dysfunction. 30 tablet 0    B Complex-C-Folic Acid Oral Tab Take by mouth.      Azelastine-Fluticasone 137-50 MCG/ACT Nasal Suspension       ALLEGRA-D ALLERGY & CONGESTION  MG Oral Tablet 12 Hr       aspirin 81 MG Oral Tab EC Take 1 tablet (81 mg total) by mouth daily.      Cholecalciferol (VITAMIN D3) 1000 units Oral Cap Take 1 tablet by mouth daily.      Omega-3 Fatty Acids (FISH OIL OR) by Does not apply route.      Multiple Vitamin (MULTIVITAMIN OR) Take  by mouth.        Counseling given: Not Answered       REVIEW OF SYSTEMS:   Review of Systems   Constitutional: Negative.    HENT:  Positive for congestion, rhinorrhea and sinus pressure.    Eyes: Negative.    Respiratory: Negative.     Cardiovascular:  Negative.    Gastrointestinal: Negative.         Suprapubic pressure   Endocrine: Negative.    Genitourinary: Negative.    Musculoskeletal: Negative.    Skin: Negative.    Neurological: Negative.    Hematological: Negative.    Psychiatric/Behavioral: Negative.           EXAM:   /60   Pulse 85   Temp 97.2 °F (36.2 °C) (Temporal)   Resp 18   Ht 6' 1\" (1.854 m)   Wt 210 lb (95.3 kg)   BMI 27.71 kg/m²  Estimated body mass index is 27.71 kg/m² as calculated from the following:    Height as of this encounter: 6' 1\" (1.854 m).    Weight as of this encounter: 210 lb (95.3 kg).   Vital signs reviewed.Appears stated age, well groomed.  Physical Exam  Exam conducted with a chaperone present (MICHELLE Reyna present for rectal exam).   Constitutional:       Appearance: Normal appearance.   HENT:      Head: Normocephalic and atraumatic.      Right Ear: Tympanic membrane normal.      Left Ear: Tympanic membrane normal.      Nose: Congestion and rhinorrhea present.      Mouth/Throat:      Mouth: Mucous membranes are moist.      Pharynx: Oropharynx is clear. No oropharyngeal exudate or posterior oropharyngeal erythema.   Eyes:      Extraocular Movements: Extraocular movements intact.      Conjunctiva/sclera: Conjunctivae normal.      Pupils: Pupils are equal, round, and reactive to light.   Cardiovascular:      Rate and Rhythm: Normal rate and regular rhythm.      Pulses: Normal pulses.      Heart sounds: Normal heart sounds.   Pulmonary:      Effort: Pulmonary effort is normal.      Breath sounds: Normal breath sounds. No wheezing, rhonchi or rales.   Abdominal:      General: Abdomen is flat. Bowel sounds are normal. There is no distension.      Palpations: Abdomen is soft. There is no mass.      Tenderness: There is no right CVA tenderness or left CVA tenderness.   Genitourinary:     Prostate: Normal. Not enlarged, not tender and no nodules present.      Rectum: No mass or tenderness. Normal anal tone.   Musculoskeletal:       Cervical back: Normal range of motion and neck supple. No tenderness.   Lymphadenopathy:      Cervical: No cervical adenopathy.   Skin:     General: Skin is warm and dry.      Capillary Refill: Capillary refill takes less than 2 seconds.   Neurological:      General: No focal deficit present.      Mental Status: He is alert and oriented to person, place, and time.   Psychiatric:         Mood and Affect: Mood normal.         Behavior: Behavior normal.       Results for orders placed or performed in visit on 02/05/24   Urine Dip, auto without Micro   Result Value Ref Range    Glucose Urine Negative Negative mg/dL    Bilirubin Urine Negative Negative    Ketones, UA Negative Negative - Trace mg/dL    Spec Gravity 1.0 (A) 1.005 - 1.030    Blood Urine Negative Negative    PH Urine 5.5 5.0 - 8.0    Protein Urine Negative Negative - Trace mg/dL    Urobilinogen Urine 0.2 0.2 - 1.0 mg/dL    Nitrite Urine Negative Negative    Leukocyte Esterase Urine Negative Negative    APPEARANCE clear Clear    Color Urine yellow Yellow    Multistix Lot# [id number] Numeric    Multistix Expiration Date 8,312,024 Date           ASSESSMENT AND PLAN:     1. Acute non-recurrent sinusitis, unspecified location    2. Suprapubic pressure      Urine dip completed in office was normal, will sent for culture.   Will start on Levofloxacin 500mg daily x 10 days.   Take the antibiotic with food.  Take a probiotic while you are on the antibiotic, such as Align (this is a capsule that is available over-the-counter), or organic yogurt  Drink plenty of fluids and electrolytes.  Advised patient to update me upon completion of antibiotic if suprapubic pressure with urination persist. At that time will refer to urology for further evaluation.       Meds & Refills for this Visit:  Requested Prescriptions     Signed Prescriptions Disp Refills    levoFLOXacin 500 MG Oral Tab 10 tablet 0     Sig: Take 1 tablet (500 mg total) by mouth daily for 10 days.        Health Maintenance:  Health Maintenance Due   Topic Date Due    Zoster Vaccines (2 of 2) 09/16/2021    DTaP,Tdap,and Td Vaccines (2 - Td or Tdap) 02/27/2023    COVID-19 Vaccine (3 - 2023-24 season) 09/01/2023    Influenza Vaccine (1) 10/01/2023    Annual Depression Screening  01/01/2024       Patient/Caregiver Education: Patient/Caregiver Education: There are no barriers to learning. Medical education done.   Outcome: Patient verbalizes understanding. Patient is notified to call with any questions, complications, allergies, or worsening or changing symptoms.  Patient is to call with any side effects or complications from the treatments as a result of today.     Problem List:  Patient Active Problem List   Diagnosis    Pure hypercholesterolemia    Pure hyperglyceridemia    Vitamin D deficiency    Multiple allergies    Degeneration of lumbar or lumbosacral intervertebral disc    Fibroepithelial polyp    Diverticulitis, colon    Family history of coronary artery disease in father    Primary osteoarthritis of left knee               Global exp 10-25-17 / LEFT KNEE / TRK     Acute medial meniscus tear of left knee, initial encounter    Acute medial meniscus tear of left knee            Global exp 10-25-17 / LEFT KNEE / TRK     Acute lateral meniscus tear of left knee               Global exp 10-25-17 / LEFT KNEE / TRK     Erectile dysfunction    History of adenomatous polyp of colon    Polyp of colon    Memory loss       Jacqui Anne, APRN    Total time appointment length: 30 minutes in which time was spent on chart review, examination, documentation, counseling and coordination of care.    Please note that portions of this note may have been completed with a voice recognition program. Efforts were made to edit the dictations but occasionally words are mis-transcribed.    The 21st Century Cures Act makes medical notes like these available to patients in the interest of transparency. Please be advised this is a  medical document. Medical documents are intended to carry relevant information, facts as evident, and the clinical opinion of the practitioner. The medical note is intended as peer to peer communication and may appear blunt or direct. It is written in medical language and may contain abbreviations or verbiage that are unfamiliar.

## 2024-02-05 NOTE — TELEPHONE ENCOUNTER
S/w pt.    Reports pressure lower mid abd while urinating,intermittent since sat  No n/v/d  Feels the pressure is worse at night  No dysuria  No radiating pain  No fevers  No sharp/shooting pains  No n/v/d    Also mentions sinus sx's as below.    Open to seeing Jacqui botello. Please advise opal

## 2024-02-05 NOTE — PATIENT INSTRUCTIONS
Take the antibiotic with food.  Take a probiotic while you are on the antibiotic, such as Align (this is a capsule that is available over-the-counter), or organic yogurt  Drink plenty of fluids and electrolytes.    Update me if there is no improvement in your urinary symptoms after finishing antibiotic

## 2024-02-07 ENCOUNTER — TELEPHONE (OUTPATIENT)
Dept: PODIATRY CLINIC | Facility: CLINIC | Age: 64
End: 2024-02-07

## 2024-02-15 ENCOUNTER — PATIENT MESSAGE (OUTPATIENT)
Dept: FAMILY MEDICINE CLINIC | Facility: CLINIC | Age: 64
End: 2024-02-15

## 2024-02-15 NOTE — TELEPHONE ENCOUNTER
From: Brenton Root  To: Jacqui Anne  Sent: 2/15/2024 9:29 AM CST  Subject: Sinus Infection     I wanted to let know that the lower abdominal pain I was having stopped after being on the antibiotics for a day. However, after finishing the medication for this sinus infection as well, it has not cleared up. The sinus pressure is not as bad & I am hoping that it clears up in the next couple of days. If not I’ll will let you know,   Thank you     Kris Root

## 2024-08-23 ENCOUNTER — OFFICE VISIT (OUTPATIENT)
Dept: FAMILY MEDICINE CLINIC | Facility: CLINIC | Age: 64
End: 2024-08-23
Payer: MEDICARE

## 2024-08-23 ENCOUNTER — LAB ENCOUNTER (OUTPATIENT)
Dept: LAB | Age: 64
End: 2024-08-23
Payer: MEDICARE

## 2024-08-23 VITALS
SYSTOLIC BLOOD PRESSURE: 118 MMHG | DIASTOLIC BLOOD PRESSURE: 72 MMHG | HEIGHT: 72.99 IN | WEIGHT: 200 LBS | BODY MASS INDEX: 26.51 KG/M2 | TEMPERATURE: 97 F | HEART RATE: 82 BPM

## 2024-08-23 DIAGNOSIS — R10.31 RLQ ABDOMINAL PAIN: ICD-10-CM

## 2024-08-23 DIAGNOSIS — M89.8X1 SHOULDER BLADE PAIN: ICD-10-CM

## 2024-08-23 DIAGNOSIS — R10.84 GENERALIZED ABDOMINAL PAIN: ICD-10-CM

## 2024-08-23 DIAGNOSIS — R10.13 EPIGASTRIC PAIN: ICD-10-CM

## 2024-08-23 DIAGNOSIS — R10.13 EPIGASTRIC PAIN: Primary | ICD-10-CM

## 2024-08-23 DIAGNOSIS — R14.3 EXCESSIVE FLATUS: ICD-10-CM

## 2024-08-23 LAB
ALBUMIN SERPL-MCNC: 4.8 G/DL (ref 3.2–4.8)
ALBUMIN/GLOB SERPL: 2 {RATIO} (ref 1–2)
ALP LIVER SERPL-CCNC: 74 U/L
ALT SERPL-CCNC: 29 U/L
ANION GAP SERPL CALC-SCNC: 1 MMOL/L (ref 0–18)
AST SERPL-CCNC: 30 U/L (ref ?–34)
ATRIAL RATE: 82 BPM
BASOPHILS # BLD AUTO: 0.08 X10(3) UL (ref 0–0.2)
BASOPHILS NFR BLD AUTO: 1.4 %
BILIRUB SERPL-MCNC: 0.8 MG/DL (ref 0.2–1.1)
BILIRUB UR QL STRIP.AUTO: NEGATIVE
BUN BLD-MCNC: 13 MG/DL (ref 9–23)
CALCIUM BLD-MCNC: 9.9 MG/DL (ref 8.7–10.4)
CHLORIDE SERPL-SCNC: 102 MMOL/L (ref 98–112)
CLARITY UR REFRACT.AUTO: CLEAR
CO2 SERPL-SCNC: 34 MMOL/L (ref 21–32)
COLOR UR AUTO: YELLOW
CREAT BLD-MCNC: 0.94 MG/DL
EGFRCR SERPLBLD CKD-EPI 2021: 90 ML/MIN/1.73M2 (ref 60–?)
EOSINOPHIL # BLD AUTO: 0.37 X10(3) UL (ref 0–0.7)
EOSINOPHIL NFR BLD AUTO: 6.4 %
ERYTHROCYTE [DISTWIDTH] IN BLOOD BY AUTOMATED COUNT: 12.1 %
FASTING STATUS PATIENT QL REPORTED: YES
GLOBULIN PLAS-MCNC: 2.4 G/DL (ref 2–3.5)
GLUCOSE BLD-MCNC: 85 MG/DL (ref 70–99)
GLUCOSE UR STRIP.AUTO-MCNC: NORMAL MG/DL
HCT VFR BLD AUTO: 44.1 %
HGB BLD-MCNC: 15.4 G/DL
IMM GRANULOCYTES # BLD AUTO: 0.02 X10(3) UL (ref 0–1)
IMM GRANULOCYTES NFR BLD: 0.3 %
KETONES UR STRIP.AUTO-MCNC: NEGATIVE MG/DL
LEUKOCYTE ESTERASE UR QL STRIP.AUTO: NEGATIVE
LIPASE SERPL-CCNC: 27 U/L (ref 12–53)
LYMPHOCYTES # BLD AUTO: 1.52 X10(3) UL (ref 1–4)
LYMPHOCYTES NFR BLD AUTO: 26.3 %
MCH RBC QN AUTO: 30.7 PG (ref 26–34)
MCHC RBC AUTO-ENTMCNC: 34.9 G/DL (ref 31–37)
MCV RBC AUTO: 88 FL
MONOCYTES # BLD AUTO: 0.57 X10(3) UL (ref 0.1–1)
MONOCYTES NFR BLD AUTO: 9.9 %
NEUTROPHILS # BLD AUTO: 3.22 X10 (3) UL (ref 1.5–7.7)
NEUTROPHILS # BLD AUTO: 3.22 X10(3) UL (ref 1.5–7.7)
NEUTROPHILS NFR BLD AUTO: 55.7 %
NITRITE UR QL STRIP.AUTO: NEGATIVE
OSMOLALITY SERPL CALC.SUM OF ELEC: 283 MOSM/KG (ref 275–295)
P AXIS: 67 DEGREES
P-R INTERVAL: 152 MS
PH UR STRIP.AUTO: 6.5 [PH] (ref 5–8)
PLATELET # BLD AUTO: 263 10(3)UL (ref 150–450)
POTASSIUM SERPL-SCNC: 4.3 MMOL/L (ref 3.5–5.1)
PROT SERPL-MCNC: 7.2 G/DL (ref 5.7–8.2)
PROT UR STRIP.AUTO-MCNC: NEGATIVE MG/DL
Q-T INTERVAL: 354 MS
QRS DURATION: 92 MS
QTC CALCULATION (BEZET): 413 MS
R AXIS: 76 DEGREES
RBC # BLD AUTO: 5.01 X10(6)UL
RBC UR QL AUTO: NEGATIVE
SODIUM SERPL-SCNC: 137 MMOL/L (ref 136–145)
SP GR UR STRIP.AUTO: 1.02 (ref 1–1.03)
T AXIS: 71 DEGREES
UROBILINOGEN UR STRIP.AUTO-MCNC: NORMAL MG/DL
VENTRICULAR RATE: 82 BPM
WBC # BLD AUTO: 5.8 X10(3) UL (ref 4–11)

## 2024-08-23 PROCEDURE — 80053 COMPREHEN METABOLIC PANEL: CPT

## 2024-08-23 PROCEDURE — 99214 OFFICE O/P EST MOD 30 MIN: CPT

## 2024-08-23 PROCEDURE — 85025 COMPLETE CBC W/AUTO DIFF WBC: CPT

## 2024-08-23 PROCEDURE — 81003 URINALYSIS AUTO W/O SCOPE: CPT

## 2024-08-23 PROCEDURE — 83690 ASSAY OF LIPASE: CPT

## 2024-08-23 PROCEDURE — 93000 ELECTROCARDIOGRAM COMPLETE: CPT

## 2024-08-23 NOTE — PROGRESS NOTES
[facility] Office Note  Chief Complaint:   Chief Complaint   Patient presents with    Abdominal Pain     C/o abdominal pain        HPI:   This is a 64 year old male coming in for intermittent abodminal pain since January of this year. Episodes of pain occur at random and vary by location. Pain can be present in epigastric region, RUQ, LUQ, RLQ, or LLQ. Pain is occasionally present at night. Sometimes radiates to the back between his shoulder blades. No particular pattern in relation to food consumption. Has noticed increased gassiness. Denies any changes in bowel habits or urinary symptoms. He is not experiencing any fevers or chills.     Over-the-counter: none    Patient Active Problem List   Diagnosis    Pure hypercholesterolemia    Pure hyperglyceridemia    Vitamin D deficiency    Multiple allergies    Degeneration of lumbar or lumbosacral intervertebral disc    Fibroepithelial polyp    Diverticulitis, colon    Family history of coronary artery disease in father    Primary osteoarthritis of left knee               Global exp 10-25-17 / LEFT KNEE / TRK     Acute medial meniscus tear of left knee, initial encounter    Acute medial meniscus tear of left knee            Global exp 10-25-17 / LEFT KNEE / TRK     Acute lateral meniscus tear of left knee               Global exp 10-25-17 / LEFT KNEE / TRK     Erectile dysfunction    History of adenomatous polyp of colon    Polyp of colon    Memory loss     Past Medical History:    Arthritis    Back pain    Chest pain on exertion    DDD (degenerative disc disease), lumbar    Flatulence/gas pain/belching    Hemorrhoids    Night sweats    Other and unspecified hyperlipidemia    Pain in joints    Sinusitis    Sleep apnea    Sleep disturbance    Vitamin D deficiency     Past Surgical History:   Procedure Laterality Date    Colonoscopy  3/2010    complete, polyps    Colonoscopy  2010    polyps due in 5 years    Colonoscopy  7/2015    due in 5 years  dr Rodriguez    Other surgical history      eye surgery for strabismus     Social History:  Social History     Socioeconomic History    Marital status:    Tobacco Use    Smoking status: Never    Smokeless tobacco: Never   Vaping Use    Vaping status: Never Used   Substance and Sexual Activity    Alcohol use: Yes     Alcohol/week: 0.0 standard drinks of alcohol     Comment: occ    Drug use: No    Sexual activity: Yes     Partners: Female   Other Topics Concern    Caffeine Concern Yes     Comment: 1qd    Stress Concern No    Weight Concern No    Special Diet No    Exercise Yes    Seat Belt Yes     Family History:  Family History   Problem Relation Age of Onset    Heart Attack Father         AMI    Heart Disease Father         valve replacement    Heart Disorder Father         MI at 42/stent coronary    Hypertension Father     Lipids Father     Other (Other) Father         arthritis    Other (Other) Mother         bowel rupture/arthritis    Heart Disorder Paternal Grandmother     Heart Disorder Paternal Grandfather     Heart Disease Paternal Grandfather          from Heart Attcak.     Allergies:  Allergies   Allergen Reactions    Pcn [Penicillins] HIVES    Ceftin RASH     TABS    Lipitor [Atorvastatin Calcium]      TABS: Aches    Sulfa Drugs Cross Reactors      Fever/Flu-like symptoms     Current Meds:  Current Outpatient Medications   Medication Sig Dispense Refill    Sildenafil Citrate 50 MG Oral Tab Take 1 tablet (50 mg total) by mouth daily as needed for Erectile Dysfunction. 30 tablet 0    B Complex-C-Folic Acid Oral Tab Take by mouth.      Azelastine-Fluticasone 137-50 MCG/ACT Nasal Suspension       ALLEGRA-D ALLERGY & CONGESTION  MG Oral Tablet 12 Hr       aspirin 81 MG Oral Tab EC Take 1 tablet (81 mg total) by mouth daily.      Cholecalciferol (VITAMIN D3) 1000 units Oral Cap Take 1 tablet by mouth daily.      Omega-3 Fatty Acids (FISH OIL OR) by Does not apply route.      Multiple Vitamin  (MULTIVITAMIN OR) Take  by mouth.        Counseling given: Not Answered       REVIEW OF SYSTEMS:   Review of Systems   Constitutional: Negative.    HENT: Negative.     Eyes: Negative.    Respiratory: Negative.     Gastrointestinal:  Positive for heartburn and abdominal pain. Negative for nausea, vomiting, diarrhea, constipation, blood in stool and abdominal distention.   Endocrine: Negative.    Genitourinary: Negative.    Musculoskeletal: Negative.    Skin: Negative.    Neurological: Negative.    Hematological: Negative.    Psychiatric/Behavioral: Negative.           EXAM:   /72   Pulse 82   Temp 97 °F (36.1 °C)   Ht 6' 0.99\" (1.854 m)   Wt 200 lb (90.7 kg)   BMI 26.39 kg/m²  Estimated body mass index is 26.39 kg/m² as calculated from the following:    Height as of this encounter: 6' 0.99\" (1.854 m).    Weight as of this encounter: 200 lb (90.7 kg).   Vital signs reviewed.Appears stated age, well groomed.  Physical Exam  Constitutional:       Appearance: Normal appearance.   HENT:      Head: Normocephalic and atraumatic.      Nose: Nose normal.      Mouth/Throat:      Mouth: Mucous membranes are moist.      Pharynx: Oropharynx is clear.   Eyes:      Conjunctiva/sclera: Conjunctivae normal.      Pupils: Pupils are equal, round, and reactive to light.   Cardiovascular:      Rate and Rhythm: Normal rate and regular rhythm.      Pulses: Normal pulses.      Heart sounds: Normal heart sounds. No murmur heard.     No friction rub. No gallop.   Pulmonary:      Effort: Pulmonary effort is normal.      Breath sounds: Normal breath sounds. No wheezing, rhonchi or rales.   Abdominal:      General: Abdomen is flat. Bowel sounds are normal. There is no distension.      Palpations: Abdomen is soft. There is no mass.      Tenderness: There is abdominal tenderness (mild RLQ discomfort with palpation) in the right lower quadrant. There is no right CVA tenderness, left CVA tenderness, guarding or rebound. Negative signs  include Dominguez's sign and Rovsing's sign.   Skin:     General: Skin is warm and dry.      Capillary Refill: Capillary refill takes less than 2 seconds.   Neurological:      General: No focal deficit present.      Mental Status: He is alert and oriented to person, place, and time. Mental status is at baseline.      Cranial Nerves: Cranial nerves 2-12 are intact.      Sensory: Sensation is intact.      Motor: Motor function is intact.   Psychiatric:         Mood and Affect: Mood normal.         Behavior: Behavior normal.       Status: Final result       Visible to patient: Yes (not seen)       Next appt: 09/20/2024 at 10:40 AM in Orthopaedics ([facility])       Dx: Epigastric pain; Shoulder blade pain    0 Result Notes       Component  Ref Range & Units 11:32 AM 7 yr ago   Ventricular rate  BPM 82 79   Atrial rate  BPM 82 79   P-R Interval  ms 152 162   QRS Duration  ms 92 98   Q-T Interval  ms 354 374   QTC Calculation (Bezet)  ms 413 428   P Axis  degrees 67 69   R Axis  degrees 76 69   T Axis  degrees 71 66   Resulting Agency [facility]              Narrative  Performed by: MUSE  Normal sinus rhythm  Normal ECG  When compared with ECG of 22-JUN-2017 07:25,  No significant change was found  Confirmed by Jacqui Anne ([id number]) on 8/23/2024 12:37:19 PM      Specimen Collected: 08/23/24 11:32 AM Last Resulted: 08/23/24 12:37 PM          ASSESSMENT AND PLAN:     1. Epigastric pain    2. Generalized abdominal pain    3. Excessive flatus    4. RLQ abdominal pain    5. Shoulder blade pain        1. Epigastric pain  - EKG with interpretation and Report -IN OFFICE [[id number]]  - CBC W Differential W Platelet [E]; Future  - Comp Metabolic Panel (14) [E]; Future  - Lipase [E]; Future  - H. Pylori Stool Ag, EIA [E]; Future  - CT ABDOMEN+PELVIS(CONTRAST ONLY)(CPT=74177); Future    2. Generalized abdominal pain  - CBC W Differential W Platelet [E]; Future  - Comp Metabolic Panel (14) [E]; Future  - Lipase [E]; Future  - H.  Pylori Stool Ag, EIA [E]; Future  - CT ABDOMEN+PELVIS(CONTRAST ONLY)(CPT=74177); Future  - UA/M With Culture Reflex [E]; Future    3. Excessive flatus  - H. Pylori Stool Ag, EIA [E]; Future    4. RLQ abdominal pain  - CT ABDOMEN+PELVIS(CONTRAST ONLY)(CPT=74177); Future  - UA/M With Culture Reflex [E]; Future    5. Shoulder blade pain  - EKG with interpretation and Report -IN OFFICE [[id number]]    EKG completed in office-- NSR, stable with previous   Complete lab work and stool test   Schedule CT abdomen and pelvis  Further recommendations pending test results. If no abnormalities or acute findings, consider trial of PPI given associated epigastric pain which can be present overnight      Meds & Refills for this Visit:  Requested Prescriptions      No prescriptions requested or ordered in this encounter       The patient indicates understanding of these issues and agrees to the plan.     Health Maintenance:  Health Maintenance Due   Topic Date Due    Zoster Vaccines (2 of 2) 09/16/2021    COVID-19 Vaccine (3 - 2023-24 season) 09/01/2023    Annual Depression Screening  01/01/2024    Pneumococcal Vaccine: 65+ Years (2 of 2 - PCV) 04/11/2024    Annual Physical  08/08/2024         Jacqui Anne, APRN    Please note that portions of this note may have been completed with a voice recognition program. Efforts were made to edit the dictations but occasionally words are mis-transcribed.    The 21st Century Cures Act makes medical notes like these available to patients in the interest of transparency. Please be advised this is a medical document. Medical documents are intended to carry relevant information, facts as evident, and the clinical opinion of the practitioner. The medical note is intended as peer to peer communication and may appear blunt or direct. It is written in medical language and may contain abbreviations or verbiage that are unfamiliar.

## 2024-09-06 ENCOUNTER — HOSPITAL ENCOUNTER (OUTPATIENT)
Dept: CT IMAGING | Facility: HOSPITAL | Age: 64
Discharge: HOME | End: 2024-09-06
Payer: MEDICARE

## 2024-09-06 DIAGNOSIS — R10.13 EPIGASTRIC PAIN: ICD-10-CM

## 2024-09-06 DIAGNOSIS — R10.84 GENERALIZED ABDOMINAL PAIN: ICD-10-CM

## 2024-09-06 DIAGNOSIS — R10.31 RLQ ABDOMINAL PAIN: ICD-10-CM

## 2024-09-06 PROCEDURE — 74177 CT ABD & PELVIS W/CONTRAST: CPT

## 2024-09-20 ENCOUNTER — OFFICE VISIT (OUTPATIENT)
Dept: ORTHOPEDICS CLINIC | Facility: CLINIC | Age: 64
End: 2024-09-20
Payer: MEDICARE

## 2024-09-20 VITALS — HEIGHT: 72 IN | WEIGHT: 200 LBS | BODY MASS INDEX: 27.09 KG/M2

## 2024-09-20 DIAGNOSIS — M17.11 PRIMARY OSTEOARTHRITIS OF RIGHT KNEE: Primary | ICD-10-CM

## 2024-09-20 DIAGNOSIS — M18.12 PRIMARY OSTEOARTHRITIS OF FIRST CARPOMETACARPAL JOINT OF LEFT HAND: ICD-10-CM

## 2024-09-20 PROCEDURE — 99213 OFFICE O/P EST LOW 20 MIN: CPT | Performed by: PHYSICIAN ASSISTANT

## 2024-09-20 PROCEDURE — 20600 DRAIN/INJ JOINT/BURSA W/O US: CPT | Performed by: PHYSICIAN ASSISTANT

## 2024-09-20 RX ORDER — BETAMETHASONE SODIUM PHOSPHATE AND BETAMETHASONE ACETATE 3; 3 MG/ML; MG/ML
6 INJECTION, SUSPENSION INTRA-ARTICULAR; INTRALESIONAL; INTRAMUSCULAR; SOFT TISSUE ONCE
Status: COMPLETED | OUTPATIENT
Start: 2024-09-20 | End: 2024-09-20

## 2024-09-20 RX ADMIN — BETAMETHASONE SODIUM PHOSPHATE AND BETAMETHASONE ACETATE 6 MG: 3; 3 INJECTION, SUSPENSION INTRA-ARTICULAR; INTRALESIONAL; INTRAMUSCULAR; SOFT TISSUE at 10:52:00

## 2024-09-20 NOTE — PROGRESS NOTES
.  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  We discussed CMC arthroplasty today and he declined surgery.  We will move forward with another injection.  All of his questions were answered.  Left FPL tendinitis-we will monitor given its intermittent nature for improvement from the steroid injection by its local effect.  Could also be trigger finger.  Discussed with the patient if it begins to trigger gets stuck that is likely trigger finger but I suspect it is FPL tendinitis.     Follow Up: As needed    Injection: Written consent was obtained.  Skin was prepped with ChloraPrep.  1 mL of 6 mg of betamethasone and 1 mL of 1% lidocaine was injected into the left thumb CMC joint.  Patient tolerated the procedure well.  No complications were encountered.  Band-Aid was applied.'  Diagnostic Studies:  X-ray left thumb: Advanced degenerative changes of the CMC joint with joint space obliteration.    Physical Exam:    Ht 6' (1.829 m)   Wt 200 lb (90.7 kg)   BMI 27.12 kg/m²     Constitutional: NAD. AOx3. Well-developed and Well-nourished.   Psychiatric: Normal mood/ affect/ behavior. Judgment and thought content normal.     Left upper Extremity:   Inspection: Skin Intact. No skin lesions. No gross deformity.   Palpation:  Tenderness to palpation of the left CMC joint   Motion: Elbow: normal bilateral symmetric ext/flex  Wrist: normal bilateral symmetric ext/flex/sup/pro  Finger: full composite fist   Special Tests:  Pain with seesaw test.  Pain with grind.  There is instability of the CMC joint.  No triggering or pain at the A1 pulley.       CC: Left thumb pain     HPI: This 64 year old right-hand-dominant male presents with left thumb pain.  Started about 10 years ago and has continued to get worse.  He does note pain along the FPL tendon course.  The pain is moderate and at times severe.  He describes the pain is sharp shooting and cramping in  nature.  There is no injuries associated with this.  He does localize most of his pain to the CMC joint.  The pain does not wake him up at night.  He has not tried anything yet.  He lives at home with his wife.    Occupation: retired.    Interval history patient states that his CMC pain on the left is worsening.      Past Medical History:    Arthritis    Back pain    Chest pain on exertion    DDD (degenerative disc disease), lumbar    Flatulence/gas pain/belching    Hemorrhoids    Night sweats    Other and unspecified hyperlipidemia    Pain in joints    Sinusitis    Sleep apnea    Sleep disturbance    Vitamin D deficiency     Past Surgical History:   Procedure Laterality Date    Colonoscopy  3/2010    complete, polyps    Colonoscopy  2010    polyps due in 5 years    Colonoscopy  7/2015    due in 5 years dr Rodriguez    Other surgical history      eye surgery for strabismus     Current Outpatient Medications   Medication Sig Dispense Refill    Sildenafil Citrate 50 MG Oral Tab Take 1 tablet (50 mg total) by mouth daily as needed for Erectile Dysfunction. 30 tablet 0    B Complex-C-Folic Acid Oral Tab Take by mouth.      Azelastine-Fluticasone 137-50 MCG/ACT Nasal Suspension       aspirin 81 MG Oral Tab EC Take 1 tablet (81 mg total) by mouth daily.      Cholecalciferol (VITAMIN D3) 1000 units Oral Cap Take 1 tablet by mouth daily.      Omega-3 Fatty Acids (FISH OIL OR) by Does not apply route.      Multiple Vitamin (MULTIVITAMIN OR) Take  by mouth.      ALLEGRA-D ALLERGY & CONGESTION  MG Oral Tablet 12 Hr        Allergies   Allergen Reactions    Pcn [Penicillins] HIVES    Ceftin RASH     TABS    Lipitor [Atorvastatin Calcium]      TABS: Aches    Sulfa Drugs Cross Reactors      Fever/Flu-like symptoms     Family History   Problem Relation Age of Onset    Heart Attack Father         AMI    Heart Disease Father         valve replacement    Heart Disorder Father         MI at 42/stent coronary    Hypertension Father      Lipids Father     Other (Other) Father         arthritis    Other (Other) Mother         bowel rupture/arthritis    Heart Disorder Paternal Grandmother     Heart Disorder Paternal Grandfather     Heart Disease Paternal Grandfather          from Heart [place].     Social History     Occupational History    Not on file   Tobacco Use    Smoking status: Never    Smokeless tobacco: Never   Vaping Use    Vaping status: Never Used   Substance and Sexual Activity    Alcohol use: Yes     Alcohol/week: 0.0 standard drinks of alcohol     Comment: occ    Drug use: No    Sexual activity: Yes     Partners: Female        Review of Systems (negative unless bolded):  General: fevers, chills, fatigure  CV:  chest pain, palpitations, leg swelling  Msk: bodyaches, neck pain, neck stiffness  Skin: rashes, open wounds, nonhealing ulcers  Hem: bleeds easily, bruise easily, immunocompromised  Neuro: dizziness, light headedness, headaches  Psych: anxious, depressed, anger issues      Melissa Boston PA-C  Hand, Wrist, & Elbow Surgery  Physician Assistant to Dr. Artie Salas  [facility]  [address] [place] [address], [address], [email or web address]  [email or web address]  t: [phone]  f: [phone]

## 2024-11-01 ENCOUNTER — PATIENT OUTREACH (OUTPATIENT)
Dept: FAMILY MEDICINE CLINIC | Facility: CLINIC | Age: 64
End: 2024-11-01

## 2024-12-09 ENCOUNTER — PATIENT OUTREACH (OUTPATIENT)
Dept: FAMILY MEDICINE CLINIC | Facility: CLINIC | Age: 64
End: 2024-12-09

## 2025-06-03 ENCOUNTER — OFFICE VISIT (OUTPATIENT)
Dept: FAMILY MEDICINE CLINIC | Facility: CLINIC | Age: 65
End: 2025-06-03
Payer: MEDICARE

## 2025-06-03 VITALS
HEIGHT: 72 IN | DIASTOLIC BLOOD PRESSURE: 72 MMHG | HEART RATE: 88 BPM | TEMPERATURE: 97 F | WEIGHT: 202 LBS | RESPIRATION RATE: 16 BRPM | BODY MASS INDEX: 27.36 KG/M2 | SYSTOLIC BLOOD PRESSURE: 118 MMHG

## 2025-06-03 DIAGNOSIS — E78.00 PURE HYPERCHOLESTEROLEMIA: ICD-10-CM

## 2025-06-03 DIAGNOSIS — Z12.5 SCREENING FOR PROSTATE CANCER: ICD-10-CM

## 2025-06-03 DIAGNOSIS — Z00.00 ENCOUNTER FOR ANNUAL HEALTH EXAMINATION: Primary | ICD-10-CM

## 2025-06-03 DIAGNOSIS — Z82.49 FAMILY HISTORY OF CORONARY ARTERY DISEASE IN FATHER: ICD-10-CM

## 2025-06-03 DIAGNOSIS — D22.9 MULTIPLE NEVI: ICD-10-CM

## 2025-06-03 DIAGNOSIS — N52.9 ERECTILE DYSFUNCTION, UNSPECIFIED ERECTILE DYSFUNCTION TYPE: ICD-10-CM

## 2025-06-03 DIAGNOSIS — E55.9 VITAMIN D DEFICIENCY: ICD-10-CM

## 2025-06-03 DIAGNOSIS — Z88.9 MULTIPLE ALLERGIES: ICD-10-CM

## 2025-06-03 PROCEDURE — G0438 PPPS, INITIAL VISIT: HCPCS | Performed by: FAMILY MEDICINE

## 2025-06-03 PROCEDURE — 99213 OFFICE O/P EST LOW 20 MIN: CPT | Performed by: FAMILY MEDICINE

## 2025-06-03 RX ORDER — SILDENAFIL 50 MG/1
50 TABLET, FILM COATED ORAL
Qty: 30 TABLET | Refills: 1 | Status: SHIPPED | OUTPATIENT
Start: 2025-06-03

## 2025-06-03 NOTE — PROGRESS NOTES
Subjective:   Brenton Root is a 65 year old male who presents for a Medicare Initial Annual Wellness visit (Once after 12 month Medicare anniversary)  and follow-up with cholesterolemia, ADD, multiple allergies, multiple nevi, problems after  cataract surgery.    The following individual(s) verbally consented to be recorded using ambient AI listening technology and understand that they can each withdraw their consent to this listening technology at any point by asking the clinician to turn off or pause the recording:    Patient name: Brenton Root    History of Present Illness  Brenton Root is a 65 year old male who presents for an initial Medicare wellness visit and follow-up on medical problems.  .    He underwent cataract surgery on his left eye, which is his weaker eye, at a facility in [facility]. Despite the surgery, he continues to experience visual disturbances such as 'weird lines' and issues with light, especially at night. He describes seeing movements and changes in light while watching TV or eating. 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    He has a history of sinus infections, typically experiencing them three times a year. This year, he had an ear infection in January and a sinus infection in March or April, which is unusual for him. He has been taking Claritin D, Allegra D, or Zyrtec D daily for over twenty years but stopped a week ago due to concerns about long-term medication use. He is currently doing nasal rinses three to four times a day.    He mentions a family history of aneurysms, with his brother recently diagnosed with a 4.8 cm aortic aneurysm. His father had a valve replacement two and a half years ago and passed away from complications related to DMS, which affected his liver and blood.    He has a history of angina, which he describes as stress-related, particularly around the time of his father's passing last year. No angina episodes  in the past six months.    Hypercholesterolemia diet controlled patient is intolerance of statins.  Will check blood work for recommendation pending test results    Multiple nevi seeing dermatologist.    History/Other:   Fall Risk Assessment:   He has been screened for Falls and is low risk.      Cognitive Assessment:   He had a completely normal cognitive assessment - see flowsheet entries     Functional Ability/Status:   Brenton Root has some abnormal functions as listed below:  He has Vision problems based on screening of functional status. He has problems with Memory based on screening of functional status.       Depression Screening (PHQ):  Reviewed.    Advanced Directives:   He does have a Living Will but we do NOT have it on file in Epic.    He does have a POA but we do NOT have it on file in Epic.    Discussed Advance Care Planning with patient (and family/surrogate if present). Standard forms made available to patient in After Visit Summary.      Patient Active Problem List   Diagnosis    Pure hypercholesterolemia    Pure hyperglyceridemia    Vitamin D deficiency    Multiple allergies    Degeneration of lumbar or lumbosacral intervertebral disc    Fibroepithelial polyp    Diverticulitis, colon    Family history of coronary artery disease in father    Primary osteoarthritis of left knee               Global exp 10-25-17 / LEFT KNEE / TRK     Acute medial meniscus tear of left knee, initial encounter    Acute medial meniscus tear of left knee            Global exp 10-25-17 / LEFT KNEE / TRK     Acute lateral meniscus tear of left knee               Global exp 10-25-17 / LEFT KNEE / TRK     Erectile dysfunction    History of adenomatous polyp of colon    Polyp of colon    Memory loss     Allergies:  He is allergic to pcn [penicillins], ceftin, lipitor [atorvastatin calcium], and sulfa drugs cross reactors.    Current Medications:  Outpatient Medications Marked as Taking for the 6/3/25 encounter (Office  Visit) with Kerline Olivo MD   Medication Sig    Sildenafil Citrate 50 MG Oral Tab Take 1 tablet (50 mg total) by mouth daily as needed for Erectile Dysfunction.    Loratadine-Pseudoephedrine (CLARITIN-D 12 HOUR OR)     Magnesium 125 MG Oral Cap     Azelastine-Fluticasone 137-50 MCG/ACT Nasal Suspension     Cholecalciferol (VITAMIN D3) 1000 units Oral Cap Take 1 tablet by mouth daily.    Multiple Vitamin (MULTIVITAMIN OR) Take  by mouth.       Medical History:  He  has a past medical history of Arthritis, Back pain (Degenerative Disk Disease), Chest pain on exertion (10 yrs ago), DDD (degenerative disc disease), lumbar, Flatulence/gas pain/belching (Occasionally), Hemorrhoids (Been treated previously), Night sweats (Last 10 years), Other and unspecified hyperlipidemia, Pain in joints (Hands / Back), Sinusitis (1/16/2012), Sleep apnea (Being tested later on), Sleep disturbance (Stop breathing  at night), and Vitamin D deficiency.  Surgical History:  He  has a past surgical history that includes colonoscopy (3/2010); other surgical history; colonoscopy (2010); and colonoscopy (7/2015).   Family History:  His family history includes DMS in his father; Heart Attack in his father; Heart Disease in his father and paternal grandfather; Heart Disorder in his father, paternal grandfather, and paternal grandmother; Hypertension in his father; Lipids in his father; Other in his father and mother.  Social History:  He  reports that he has never smoked. He has never used smokeless tobacco. He reports current alcohol use. He reports that he does not use drugs.    Tobacco:  He has never smoked tobacco.    CAGE Alcohol Screen:   CAGE screening score of 0 on 5/27/2025, showing low risk of alcohol abuse.      Patient Care Team:  Kerline Olivo MD as PCP - General (Family Medicine)  Kerline Olivo MD as PCP (Family Practice)  Carmenza Parkinson PT as Physical Therapist  Isma Oliveira DO as Consulting Physician  (NEUROLOGY)    Review of Systems  GENERAL: feels well otherwise  SKIN: denies any unusual skin lesions  EYES: denies blurred vision or double vision having some distortions Left  eye after cataract surgery  HEENT: denies nasal congestion, sinus pain or ST  LUNGS: denies shortness of breath with exertion  CARDIOVASCULAR: denies chest pain on exertion  GI: denies abdominal pain, denies heartburn  : 1 per night nocturia, no complaint of urinary incontinence  MUSCULOSKELETAL: denies back pain  NEURO: denies headaches  PSYCHE: denies depression or anxiety  HEMATOLOGIC: denies hx of anemia  ENDOCRINE: denies thyroid history  ALL/ASTHMA: denies hx of allergy or asthma    Objective:   Physical Exam  General Appearance:  Alert, cooperative, no distress, appears stated age   Head:  Normocephalic, without obvious abnormality, atraumatic   Eyes:  PERRL, conjunctiva/corneas clear, EOM's intact, both eyes   Ears:  Normal TM's and external ear canals, both ears   Nose: Nares normal, septum midline, mucosa normal, no drainage or sinus tenderness   Throat: Lips, mucosa, and tongue normal; teeth and gums normal   Neck: Supple, symmetrical, trachea midline, no adenopathy, thyroid: not enlarged, symmetric, no tenderness/mass/nodules, no carotid bruit or JVD   Back:   Symmetric, no curvature, ROM normal, no CVA tenderness   Lungs:   Clear to auscultation bilaterally, respirations unlabored   Chest Wall:  No tenderness or deformity   Heart:  Regular rate and rhythm, S1, S2 normal, no murmur, rub or gallop   Abdomen:   Soft, non-tender, bowel sounds active all four quadrants,  no masses, no organomegaly   Genitalia: deffered   Rectal: Normal tone, normal prostate, no masses or tenderness   Extremities: Extremities normal, atraumatic, no cyanosis or edema   Pulses: 2+ and symmetric   Skin: Skin color, texture, turgor normal, no rashes or lesions   Lymph nodes: Cervical, supraclavicular, and axillary nodes normal   Neurologic: Normal      /72 (BP Location: Left arm, Patient Position: Sitting, Cuff Size: adult)   Pulse 88   Temp 96.6 °F (35.9 °C) (Temporal)   Resp 16   Ht 6' (1.829 m)   Wt 202 lb (91.6 kg)   BMI 27.40 kg/m²  Estimated body mass index is 27.4 kg/m² as calculated from the following:    Height as of this encounter: 6' (1.829 m).    Weight as of this encounter: 202 lb (91.6 kg).    Medicare Hearing Assessment:   Hearing Screening    Time taken: 6/3/2025 10:33 AM  Screening Method: Finger Rub         Assessment & Plan:   Brenton Root is a 65 year old male who presents for a Medicare Assessment.     1. Encounter for annual health examination (Primary)  2. Pure hypercholesterolemia  -     CBC With Differential With Platelet; Future; Expected date: 06/03/2025  -     Comp Metabolic Panel (14); Future; Expected date: 06/03/2025  -     Lipid Panel; Future; Expected date: 06/03/2025  -     Urinalysis with Culture Reflex; Future; Expected date: 06/03/2025  -     TSH W Reflex To Free T4; Future; Expected date: 06/03/2025  3. Screening for prostate cancer  -     PSA Total, Screen; Future; Expected date: 06/03/2025  4. Erectile dysfunction, unspecified erectile dysfunction type  -     Sildenafil Citrate; Take 1 tablet (50 mg total) by mouth daily as needed for Erectile Dysfunction.  Dispense: 30 tablet; Refill: 1  5. Multiple allergies  6. Multiple nevi  7. Family history of coronary artery disease in father  8. Vitamin D deficiency    Assessment & Plan  General Health Maintenance    He is due for tetanus, pneumonia (Prevnar 20), and a second shingles vaccination. He expressed reluctance due to concerns about side effects and vaccine contents. The importance of tetanus and pneumonia vaccines was emphasized, especially given his age and potential exposure risks. He was informed that the Prevnar 20 vaccine does not contain mRNA and is well-tolerated in infants. Educate on the importance of tetanus and pneumonia vaccinations and  encourage consideration of receiving them. Discuss the need for a second shingles vaccination.    Hypercholesterolemia  Diet-controlled check blood work for recommendation pending test results low-carb diet.    Screening for prostate cancer examination done today PSA ordered.    Erectile dysfunction prescription was called in for sildenafil patient tolerates well.    Vitamin D deficiency monitor periodically continue supplement over-the-counter    Family history of coronary artery disease monitor cholesterol levels closely.    Post-cataract surgery complications    He underwent cataract surgery on the left eye, which is weaker, and now experiences issues with light perception, especially at night, and visual distortions. The surgery did not yield the expected results. His history of strabismus and lack of depth perception complicate the situation. He is seeking a new ophthalmologist due to the [facility] of his previous eye doctor and the surgeon. Refer to [facility] for further evaluation.    Multiple allergies  He experiences recurrent sinus infections, with recent episodes in January and March/April. He has used over-the-counter medications like Claritin D, Allegra D, and Zyrtec D for over 20 years but is concerned about long-term use. He performs nasal rinses multiple times a day. He was reassured about the safety of these medications and advised to see an allergist to identify any new or different allergens contributing to his symptoms. Refer to an allergist for further evaluation. Continue nasal rinses as needed.    Follow-up    He needs to complete fasting blood work and a prostate examination. He is scheduled for a colonoscopy in 2031. He expressed a preference for avoiding unnecessary procedures, as illustrated by his father's experience with DMS and hospice care. Perform a prostate examination during the visit. Schedule fasting blood work for next Monday. Remind about the upcoming colonoscopy in  2031.    The patient indicates understanding of these issues and agrees to the plan.  Imaging studies ordered.  Lab work ordered.  Reinforced healthy diet, lifestyle, and exercise.      Return in about 1 year (around 6/3/2026).     Kerline Olivo MD, 6/3/2025     Supplementary Documentation:   General Health:  In the past six months, have you lost more than 10 pounds without trying?: (Patient-Rptd) 2 - No  Has your appetite been poor?: (Patient-Rptd) No  Type of Diet: (Patient-Rptd) Balanced  How does the patient maintain a good energy level?: (Patient-Rptd) Daily Walks, Stretching  How would you describe your daily physical activity?: (Patient-Rptd) Light  How would you describe your current health state?: (Patient-Rptd) Good  How do you maintain positive mental well-being?: (Patient-Rptd) Social Interaction, Puzzles, Games, Visiting Friends, Visiting Family  On a scale of 0 to 10, with 0 being no pain and 10 being severe pain, what is your pain level?: (Patient-Rptd) 1 - (Mild)  In the past six months, have you experienced urine leakage?: (Patient-Rptd) 0-No  At any time do you feel concerned for the safety/well-being of yourself and/or your children, in your home or elsewhere?: (Patient-Rptd) No  Have you had any immunizations at another office such as Influenza, Hepatitis B, Tetanus, or Pneumococcal?: (Patient-Rptd) No    Health Maintenance   Topic Date Due    Pneumococcal Vaccine: 50+ Years (2 of 2 - PCV) 02/27/2014    Zoster Vaccines (2 of 2) 09/16/2021    Annual Physical  08/08/2024    COVID-19 Vaccine (3 - 2024-25 season) 09/01/2024    Annual Depression Screening  01/01/2025    PSA  07/12/2025    Influenza Vaccine (Season Ended) 10/01/2025    Colorectal Cancer Screening  08/10/2031    Fall Risk Screening (Annual)  Completed    Meningococcal B Vaccine  Aged Out

## 2025-06-03 NOTE — PATIENT INSTRUCTIONS
Healthy diet.  Stay active.   Call [phone] to schedule fasting blood work next week  fasting blood work next week.    VISIT SUMMARY:  Today, you came in for a preventive visit. We discussed your recent cataract surgery, recurrent sinus infections, and general health maintenance. We also reviewed your family history and addressed your concerns about long-term medication use and vaccinations.    YOUR PLAN:  -POST-CATARACT SURGERY COMPLICATIONS: You had cataract surgery on your left eye, but you are still experiencing issues with light perception and visual distortions, especially at night. This may be related to your history of being cross-eyed and having no depth perception. You will be referred to the [facility] for further evaluation by a new ophthalmologist.    -RECURRENT SINUS INFECTIONS: You have been experiencing sinus infections more frequently this year. You have been using over-the-counter medications for many years but recently stopped due to concerns about long-term use. It is safe to continue these medications, and you should also see an allergist to identify any new allergens. Continue doing nasal rinses as needed.    -GENERAL HEALTH MAINTENANCE: You are due for tetanus, pneumonia (Prevnar 20), and a second shingles vaccination. These vaccines are important for your health, especially given your age. The Prevnar 20 vaccine does not contain mRNA and is well-tolerated. Please consider getting these vaccinations.    INSTRUCTIONS:  Please schedule an appointment with the [facility] for further evaluation of your post-cataract surgery complications. Continue your nasal rinses and schedule an appointment with an allergist. You also need to complete fasting blood work next Monday and have a prostate examination during today's visit. Remember, your next colonoscopy is scheduled for 2031.    Contains text generated by Yessenia

## 2025-06-12 ENCOUNTER — LAB ENCOUNTER (OUTPATIENT)
Dept: LAB | Age: 65
End: 2025-06-12
Attending: FAMILY MEDICINE
Payer: MEDICARE

## 2025-06-12 DIAGNOSIS — E78.00 PURE HYPERCHOLESTEROLEMIA: ICD-10-CM

## 2025-06-12 DIAGNOSIS — Z12.5 SCREENING FOR PROSTATE CANCER: ICD-10-CM

## 2025-06-12 LAB
ALBUMIN SERPL-MCNC: 4.5 G/DL (ref 3.2–4.8)
ALBUMIN/GLOB SERPL: 2 {RATIO} (ref 1–2)
ALP LIVER SERPL-CCNC: 67 U/L (ref 45–117)
ALT SERPL-CCNC: 37 U/L (ref 10–49)
ANION GAP SERPL CALC-SCNC: 8 MMOL/L (ref 0–18)
AST SERPL-CCNC: 25 U/L (ref ?–34)
BASOPHILS # BLD AUTO: 0.07 X10(3) UL (ref 0–0.2)
BASOPHILS NFR BLD AUTO: 1.7 %
BILIRUB SERPL-MCNC: 0.9 MG/DL (ref 0.2–1.1)
BILIRUB UR QL STRIP.AUTO: NEGATIVE
BUN BLD-MCNC: 12 MG/DL (ref 9–23)
CALCIUM BLD-MCNC: 9.4 MG/DL (ref 8.7–10.6)
CHLORIDE SERPL-SCNC: 104 MMOL/L (ref 98–112)
CHOLEST SERPL-MCNC: 193 MG/DL (ref ?–200)
CLARITY UR REFRACT.AUTO: CLEAR
CO2 SERPL-SCNC: 27 MMOL/L (ref 21–32)
COMPLEXED PSA SERPL-MCNC: 0.74 NG/ML (ref ?–4)
CREAT BLD-MCNC: 0.95 MG/DL (ref 0.7–1.3)
EGFRCR SERPLBLD CKD-EPI 2021: 88 ML/MIN/1.73M2 (ref 60–?)
EOSINOPHIL # BLD AUTO: 0.32 X10(3) UL (ref 0–0.7)
EOSINOPHIL NFR BLD AUTO: 8 %
ERYTHROCYTE [DISTWIDTH] IN BLOOD BY AUTOMATED COUNT: 12.3 %
FASTING PATIENT LIPID ANSWER: YES
FASTING STATUS PATIENT QL REPORTED: YES
GLOBULIN PLAS-MCNC: 2.2 G/DL (ref 2–3.5)
GLUCOSE BLD-MCNC: 93 MG/DL (ref 70–99)
GLUCOSE UR STRIP.AUTO-MCNC: NORMAL MG/DL
HCT VFR BLD AUTO: 42.8 % (ref 39–53)
HDLC SERPL-MCNC: 41 MG/DL (ref 40–59)
HGB BLD-MCNC: 15.1 G/DL (ref 13–17.5)
IMM GRANULOCYTES # BLD AUTO: 0.01 X10(3) UL (ref 0–1)
IMM GRANULOCYTES NFR BLD: 0.2 %
KETONES UR STRIP.AUTO-MCNC: NEGATIVE MG/DL
LDLC SERPL CALC-MCNC: 126 MG/DL (ref ?–100)
LEUKOCYTE ESTERASE UR QL STRIP.AUTO: NEGATIVE
LYMPHOCYTES # BLD AUTO: 1.44 X10(3) UL (ref 1–4)
LYMPHOCYTES NFR BLD AUTO: 35.8 %
MCH RBC QN AUTO: 30.6 PG (ref 26–34)
MCHC RBC AUTO-ENTMCNC: 35.3 G/DL (ref 31–37)
MCV RBC AUTO: 86.8 FL (ref 80–100)
MONOCYTES # BLD AUTO: 0.39 X10(3) UL (ref 0.1–1)
MONOCYTES NFR BLD AUTO: 9.7 %
NEUTROPHILS # BLD AUTO: 1.79 X10 (3) UL (ref 1.5–7.7)
NEUTROPHILS # BLD AUTO: 1.79 X10(3) UL (ref 1.5–7.7)
NEUTROPHILS NFR BLD AUTO: 44.6 %
NITRITE UR QL STRIP.AUTO: NEGATIVE
NONHDLC SERPL-MCNC: 152 MG/DL (ref ?–130)
OSMOLALITY SERPL CALC.SUM OF ELEC: 287 MOSM/KG (ref 275–295)
PH UR STRIP.AUTO: 7 [PH] (ref 5–8)
PLATELET # BLD AUTO: 228 10(3)UL (ref 150–450)
POTASSIUM SERPL-SCNC: 4.3 MMOL/L (ref 3.5–5.1)
PROT SERPL-MCNC: 6.7 G/DL (ref 5.7–8.2)
PROT UR STRIP.AUTO-MCNC: NEGATIVE MG/DL
RBC # BLD AUTO: 4.93 X10(6)UL (ref 3.8–5.8)
RBC UR QL AUTO: NEGATIVE
SODIUM SERPL-SCNC: 139 MMOL/L (ref 136–145)
SP GR UR STRIP.AUTO: 1.01 (ref 1–1.03)
TRIGL SERPL-MCNC: 145 MG/DL (ref 30–149)
TSI SER-ACNC: 1.73 UIU/ML (ref 0.55–4.78)
UROBILINOGEN UR STRIP.AUTO-MCNC: NORMAL MG/DL
VLDLC SERPL CALC-MCNC: 26 MG/DL (ref 0–30)
WBC # BLD AUTO: 4 X10(3) UL (ref 4–11)

## 2025-06-12 PROCEDURE — 80061 LIPID PANEL: CPT

## 2025-06-12 PROCEDURE — 80053 COMPREHEN METABOLIC PANEL: CPT

## 2025-06-12 PROCEDURE — 84443 ASSAY THYROID STIM HORMONE: CPT

## 2025-06-12 PROCEDURE — 81003 URINALYSIS AUTO W/O SCOPE: CPT

## 2025-06-12 PROCEDURE — 36415 COLL VENOUS BLD VENIPUNCTURE: CPT

## 2025-06-12 PROCEDURE — 85025 COMPLETE CBC W/AUTO DIFF WBC: CPT

## 2025-08-04 ENCOUNTER — HOSPITAL ENCOUNTER (EMERGENCY)
Facility: HOSPITAL | Age: 65
Discharge: HOME | End: 2025-08-04
Attending: EMERGENCY MEDICINE

## 2025-08-04 VITALS
RESPIRATION RATE: 16 BRPM | SYSTOLIC BLOOD PRESSURE: 120 MMHG | DIASTOLIC BLOOD PRESSURE: 73 MMHG | OXYGEN SATURATION: 96 % | HEART RATE: 68 BPM | TEMPERATURE: 98 F

## 2025-08-04 DIAGNOSIS — M53.3 BACK PAIN, SACROILIAC: Primary | ICD-10-CM

## 2025-08-04 PROCEDURE — 99283 EMERGENCY DEPT VISIT LOW MDM: CPT

## 2025-08-04 PROCEDURE — 96372 THER/PROPH/DIAG INJ SC/IM: CPT

## 2025-08-04 PROCEDURE — 99284 EMERGENCY DEPT VISIT MOD MDM: CPT

## 2025-08-04 RX ORDER — HYDROCODONE BITARTRATE AND ACETAMINOPHEN 5; 325 MG/1; MG/1
2 TABLET ORAL ONCE
Status: COMPLETED | OUTPATIENT
Start: 2025-08-04 | End: 2025-08-04

## 2025-08-04 RX ORDER — HYDROMORPHONE HYDROCHLORIDE 1 MG/ML
1 INJECTION, SOLUTION INTRAMUSCULAR; INTRAVENOUS; SUBCUTANEOUS ONCE
Status: COMPLETED | OUTPATIENT
Start: 2025-08-04 | End: 2025-08-04

## 2025-08-04 RX ORDER — LIDOCAINE 50 MG/G
1 PATCH TOPICAL EVERY 24 HOURS
Qty: 10 PATCH | Refills: 0 | Status: SHIPPED | OUTPATIENT
Start: 2025-08-04

## 2025-08-04 RX ORDER — HYDROCODONE BITARTRATE AND ACETAMINOPHEN 5; 325 MG/1; MG/1
1 TABLET ORAL ONCE
Status: COMPLETED | OUTPATIENT
Start: 2025-08-04 | End: 2025-08-04

## 2025-08-04 RX ORDER — HYDROCODONE BITARTRATE AND ACETAMINOPHEN 7.5; 325 MG/1; MG/1
1 TABLET ORAL EVERY 6 HOURS PRN
Qty: 10 TABLET | Refills: 0 | Status: SHIPPED | OUTPATIENT
Start: 2025-08-04 | End: 2025-08-09

## 2025-08-04 RX ORDER — KETOROLAC TROMETHAMINE 30 MG/ML
30 INJECTION, SOLUTION INTRAMUSCULAR; INTRAVENOUS ONCE
Status: COMPLETED | OUTPATIENT
Start: 2025-08-04 | End: 2025-08-04

## 2025-08-29 ENCOUNTER — OFFICE VISIT (OUTPATIENT)
Dept: SURGERY | Facility: CLINIC | Age: 65
End: 2025-08-29

## 2025-08-29 VITALS
HEIGHT: 72 IN | DIASTOLIC BLOOD PRESSURE: 70 MMHG | BODY MASS INDEX: 27.36 KG/M2 | WEIGHT: 202 LBS | SYSTOLIC BLOOD PRESSURE: 130 MMHG

## 2025-08-29 DIAGNOSIS — M54.50 LUMBAR PAIN: ICD-10-CM

## 2025-08-29 DIAGNOSIS — M62.838 MUSCLE SPASM: ICD-10-CM

## 2025-08-29 DIAGNOSIS — S39.012A STRAIN OF LUMBAR REGION, INITIAL ENCOUNTER: Primary | ICD-10-CM

## 2025-08-29 PROCEDURE — 99215 OFFICE O/P EST HI 40 MIN: CPT | Performed by: PHYSICIAN ASSISTANT

## (undated) DIAGNOSIS — N52.9 ERECTILE DYSFUNCTION, UNSPECIFIED ERECTILE DYSFUNCTION TYPE: ICD-10-CM

## (undated) DIAGNOSIS — Z13.89 SCREENING FOR GENITOURINARY CONDITION: Primary | ICD-10-CM

## (undated) DIAGNOSIS — Z12.5 SCREENING FOR PROSTATE CANCER: ICD-10-CM

## (undated) DIAGNOSIS — E55.9 VITAMIN D DEFICIENCY: ICD-10-CM

## (undated) DIAGNOSIS — Z00.00 BLOOD TESTS FOR ROUTINE GENERAL PHYSICAL EXAMINATION: ICD-10-CM

## (undated) NOTE — MR AVS SNAPSHOT
[facility] [address], [address] [id number] [phone]               Thank you for choosing us for your health care visit with Brina Madera MD.  We are glad to serve you and happy to provide you with this pearson Assoc Dx:  Acute pain of left knee [M25.562]          Reason for Today's Visit     Knee Pain     Swelling           Medical Issues Discussed Today     Acute pain of left knee    -  Primary      Instructions and Information about Your Health    Diclofenac If you've recently had a stay at the Hospital you can access your discharge instructions in [email or web address] by going to Visits < Admission Summaries.  If you've been to the Emergency Department or your doctor's office, you can view your past visit information in My

## (undated) NOTE — MR AVS SNAPSHOT
[facility] [address], [address] [facility]  [phone]               Thank you for choosing us for your health care visit with Hector Hyman MD.  We are glad to serve you and happy to provide you with this summ may be prescribed. (These can be rented or purchased at [employer] and surgical or orthopedic supply stores). Follow your healthcare provider's advice about when to begin putting weight on that leg.   · Keep your leg elevated to reduce pain and swellin any broken bones, breaks, or fractures. Sometimes fractures don’t show up on the first X-ray. Bruises and sprains can sometimes hurt as much as a fracture. These injuries can take time to heal completely.  If your symptoms don’t improve or they get worse, t skin or ice an area longer than 15 minutes at a time. · Compression. Putting pressure on an injury helps reduce swelling and provides support. Wrap the injured area firmly with an elastic bandage/wrap.  Make sure not to wrap the bandage too tightly or you Take 200 mg by mouth every 6 (six) hours as needed for Pain. ALLERGY D-12 OR   Take  by mouth.            DYMISTA 137-50 MCG/ACT Susp   Generic drug:  Azelastine-Fluticasone   by Nasal route.           ergocalciferol 19305 units Caps   Take by aubrey Call [phone] for help. [employer] is NOT to be used for urgent needs. For medical emergencies, dial 911.            Visit [facility] online at  [email or web address]

## (undated) NOTE — LETTER
Patient Name: Ramona Shell  YOB: 1960          MRN number:  [id number]  Date:  8/24/2017  Referring Physician:  Lamberto Garcia     Progress Summary    Pt has attended 5 visits in Physical Therapy.      Subjective: knee painful yesterday

## (undated) NOTE — MR AVS SNAPSHOT
[facility] [address], [address] [phone]               Thank you for choosing us for your health care visit with Shannan Hickman MD.  We are glad to serve you and happy to provide you with this pearson You can access your MyChart to more actively manage your health care and view more details from this visit by going to [email or web address].   If you've recently had a stay at the Hospital you can access your discharge instructions in [address] by tripp You don’t need to join a gym. Home exercises work great.  Put more priority on exercise in your life                    Visit [facility] online at  [email or web address]

## (undated) NOTE — LETTER
Patient Name: Kwesi Vo  YOB: 1960          MRN number:  [id number]  Date:  8/10/2017  Referring Physician:  Tobias Valero           POST-OP KNEE EVALUATION:    Referring Physician: Dr. Tammy Oneal  Diagnosis: s/p arthroscopic sx L kn Patient was instructed in and issued a HEP for Knee ROM in flex and ext.  T band CKC TKE, Seated HS curl    Charges: PT Eval Moderate Complexity, In agreement with FOTO score and clinical rationale, this evaluation involved Moderate Complexity decision [facility] [de-identified] certification required: Yes  I certify the need for these services furnished under this plan of treatment and while under my care.     X___________________________________________________ Date____________________    Certification From: 6/95/3204